# Patient Record
Sex: FEMALE | Race: WHITE | Employment: OTHER | URBAN - METROPOLITAN AREA
[De-identification: names, ages, dates, MRNs, and addresses within clinical notes are randomized per-mention and may not be internally consistent; named-entity substitution may affect disease eponyms.]

---

## 2017-01-24 PROCEDURE — 81003 URINALYSIS AUTO W/O SCOPE: CPT | Performed by: INTERNAL MEDICINE

## 2017-01-24 PROCEDURE — 87086 URINE CULTURE/COLONY COUNT: CPT | Performed by: INTERNAL MEDICINE

## 2017-01-31 PROBLEM — N18.30 CRI (CHRONIC RENAL INSUFFICIENCY), STAGE 3 (MODERATE) (HCC): Status: ACTIVE | Noted: 2017-01-31

## 2017-03-02 ENCOUNTER — APPOINTMENT (OUTPATIENT)
Dept: GENERAL RADIOLOGY | Age: 67
End: 2017-03-02
Attending: EMERGENCY MEDICINE
Payer: COMMERCIAL

## 2017-03-02 ENCOUNTER — HOSPITAL ENCOUNTER (OUTPATIENT)
Age: 67
Discharge: HOME | End: 2017-03-02
Attending: EMERGENCY MEDICINE
Payer: COMMERCIAL

## 2017-03-02 VITALS
RESPIRATION RATE: 20 BRPM | WEIGHT: 123 LBS | DIASTOLIC BLOOD PRESSURE: 72 MMHG | OXYGEN SATURATION: 100 % | BODY MASS INDEX: 21 KG/M2 | HEIGHT: 64 IN | TEMPERATURE: 99 F | SYSTOLIC BLOOD PRESSURE: 160 MMHG | HEART RATE: 79 BPM

## 2017-03-02 DIAGNOSIS — R06.00 DYSPNEA, UNSPECIFIED TYPE: Primary | ICD-10-CM

## 2017-03-02 PROCEDURE — 99214 OFFICE O/P EST MOD 30 MIN: CPT

## 2017-03-02 PROCEDURE — 99215 OFFICE O/P EST HI 40 MIN: CPT

## 2017-03-02 PROCEDURE — 93010 ELECTROCARDIOGRAM REPORT: CPT | Performed by: EMERGENCY MEDICINE

## 2017-03-02 PROCEDURE — 93005 ELECTROCARDIOGRAM TRACING: CPT

## 2017-03-02 PROCEDURE — 71020 XR CHEST PA + LAT CHEST (CPT=71020): CPT

## 2017-03-02 PROCEDURE — 94640 AIRWAY INHALATION TREATMENT: CPT

## 2017-03-02 PROCEDURE — 93010 ELECTROCARDIOGRAM REPORT: CPT

## 2017-03-02 RX ORDER — ALBUTEROL SULFATE 90 UG/1
1 AEROSOL, METERED RESPIRATORY (INHALATION) EVERY 4 HOURS PRN
Qty: 1 INHALER | Refills: 0 | Status: SHIPPED | OUTPATIENT
Start: 2017-03-02 | End: 2017-03-16

## 2017-03-02 RX ORDER — IPRATROPIUM BROMIDE AND ALBUTEROL SULFATE 2.5; .5 MG/3ML; MG/3ML
3 SOLUTION RESPIRATORY (INHALATION) ONCE
Status: COMPLETED | OUTPATIENT
Start: 2017-03-02 | End: 2017-03-02

## 2017-03-02 NOTE — ED INITIAL ASSESSMENT (HPI)
PATIENT C/O SHORTNESS OF BREATH AT REST X \"FEW DAYS\"  DENIES FEVERS AT HOME. REPORTS RECENT CLEAR \"RUNNY NOSE. \"  REPORTS ACTIVITIES SUCH AS TALKING MAKE HER SHORT OF BREATH.   PATIENT STATES SHE HAS A HISTORY OF A \"SPOT\" ON HER LUNG, BUT STATES IT HA

## 2017-03-02 NOTE — ED PROVIDER NOTES
Devika Kelley is a 74year old female who presents for evaluation of  shortness of breath  HPI:   Pt complains of shortness of breath which she has had for the past day. Shortness of breath is worse with exertion.   Patient states she has had clear rhinor Calcium Carbonate-Vitamin D (CALCIUM + D OR) None Entered Disp:  Rfl:         Alc-Benzyl Alc-Sulf*    Rash, Itching  Bactrim [Sulfametho*      Ibuprofen                 Statins                   Zetia [Ezetimibe]          Past Medical History   Diagnosis LUNGS: Decreased breath sounds bilaterally on auscultation no wheezing or crackles  CARDIO: RRR without murmur  GI: no masses, HSM or tenderness  MUSCULOSKELETAL: back is not tender  EXTREMITIES: no cyanosis, clubbing or edema  NEURO: Oriented times thre MDM     The patient had an EKG done which showed a normal sinus rhythm rate 62 with motion artifact and nonspecific ST-T wave changes no acute ischemic changes and QRS duration and QT interval with WNL    After a DuoNeb treatment was

## 2017-03-15 ENCOUNTER — HOSPITAL ENCOUNTER (EMERGENCY)
Facility: HOSPITAL | Age: 67
Discharge: LEFT BEFORE DISCHARGE | End: 2017-03-15
Attending: EMERGENCY MEDICINE
Payer: MEDICARE

## 2017-03-15 VITALS
BODY MASS INDEX: 20.66 KG/M2 | TEMPERATURE: 98 F | WEIGHT: 121 LBS | DIASTOLIC BLOOD PRESSURE: 88 MMHG | OXYGEN SATURATION: 100 % | SYSTOLIC BLOOD PRESSURE: 132 MMHG | HEIGHT: 64 IN | RESPIRATION RATE: 20 BRPM | HEART RATE: 70 BPM

## 2017-03-15 DIAGNOSIS — Z53.21 PATIENT LEFT WITHOUT BEING SEEN: Primary | ICD-10-CM

## 2017-03-15 PROCEDURE — 93005 ELECTROCARDIOGRAM TRACING: CPT

## 2017-03-15 PROCEDURE — 99283 EMERGENCY DEPT VISIT LOW MDM: CPT

## 2017-03-15 PROCEDURE — 93010 ELECTROCARDIOGRAM REPORT: CPT | Performed by: INTERNAL MEDICINE

## 2017-03-15 NOTE — ED INITIAL ASSESSMENT (HPI)
Sob x 1 wk and sore throat neg for strep on antibiotic for this from own FMD pt states she is sob when walking pt has a CT of chest next month for a spot on Chest xray 6 months ago denies any chest pain at this time

## 2017-08-15 PROCEDURE — 86235 NUCLEAR ANTIGEN ANTIBODY: CPT | Performed by: NURSE PRACTITIONER

## 2017-08-15 PROCEDURE — 86803 HEPATITIS C AB TEST: CPT | Performed by: INTERNAL MEDICINE

## 2017-09-03 NOTE — ED PROVIDER NOTES
Patient Seen in: [facility] Emergency Department    History   Patient presents with:   Insomnia  Vomiting    Stated Complaint: Insomnia     HPI    70 yo F with PMH HTN, HL, anxiety, chronic upper abdominal pain/GERD on omeprazole and morphine 15mg pr hours as needed. morphINE Sulfate IR 30 MG Oral Tab,  Take 1 tablet (30 mg total) by mouth every 4 (four) hours as needed. LORazepam 2 MG Oral Tab,  Take 1 tablet (2 mg total) by mouth nightly.  at bedtime   OMEPRAZOLE 20 MG Oral Capsule Delayed Release air)    Current:/96   Pulse 71   Temp 98.6 °F (37 °C) (Temporal)   Resp 18   Ht 162.6 cm (5' 4\")   Wt 54.4 kg   SpO2 98%   BMI 20.60 kg/m²         Physical Exam   Constitutional: No distress. HEENT: MMM. Head: Normocephalic.    Cardiovascular: RRR family with prn antiemetics. 1917: Labs nonacute, clinically/symptomatically improved with lorazepam. DC home in care of family with prn antiemetics.       Disposition and Plan     Clinical Impression:  Anxiety state  (primary encounter diagnosis)  Nause

## 2017-09-03 NOTE — ED INITIAL ASSESSMENT (HPI)
Pt called EMS because she is unable to sleep and her appetite is low. Last night she took her Morphine for chronic pain which she threw up; she claims that she would vomit out everything she ate. This has been going on since yesterday.   Pt claims to be an

## 2017-09-04 NOTE — ED NOTES
Medicated with additional 1mg IV ativan as ordered, preparing for discharge home. Granddaughter Jany Presser to transport pt home and stay the night with her (awaiting Romina's arrival to ER).

## 2017-09-19 PROCEDURE — 82570 ASSAY OF URINE CREATININE: CPT | Performed by: INTERNAL MEDICINE

## 2017-09-19 PROCEDURE — 82043 UR ALBUMIN QUANTITATIVE: CPT | Performed by: INTERNAL MEDICINE

## 2017-09-19 PROCEDURE — 81003 URINALYSIS AUTO W/O SCOPE: CPT | Performed by: INTERNAL MEDICINE

## 2017-10-31 PROCEDURE — 82570 ASSAY OF URINE CREATININE: CPT | Performed by: INTERNAL MEDICINE

## 2017-10-31 PROCEDURE — 84156 ASSAY OF PROTEIN URINE: CPT | Performed by: INTERNAL MEDICINE

## 2017-10-31 PROCEDURE — 83970 ASSAY OF PARATHORMONE: CPT | Performed by: INTERNAL MEDICINE

## 2018-01-04 ENCOUNTER — HOSPITAL ENCOUNTER (EMERGENCY)
Facility: HOSPITAL | Age: 68
Discharge: HOME | End: 2018-01-04
Payer: MEDICARE

## 2018-01-04 ENCOUNTER — APPOINTMENT (OUTPATIENT)
Dept: GENERAL RADIOLOGY | Facility: HOSPITAL | Age: 68
End: 2018-01-04
Payer: MEDICARE

## 2018-01-04 VITALS
HEIGHT: 64 IN | TEMPERATURE: 98 F | WEIGHT: 108 LBS | DIASTOLIC BLOOD PRESSURE: 60 MMHG | OXYGEN SATURATION: 100 % | BODY MASS INDEX: 18.44 KG/M2 | HEART RATE: 63 BPM | RESPIRATION RATE: 18 BRPM | SYSTOLIC BLOOD PRESSURE: 165 MMHG

## 2018-01-04 DIAGNOSIS — S29.011A MUSCLE STRAIN OF CHEST WALL, INITIAL ENCOUNTER: Primary | ICD-10-CM

## 2018-01-04 DIAGNOSIS — R07.89 CHEST PAIN, ATYPICAL: ICD-10-CM

## 2018-01-04 LAB
ANION GAP SERPL CALC-SCNC: 8 MMOL/L (ref 0–18)
BASOPHILS # BLD: 0.1 K/UL (ref 0–0.2)
BASOPHILS NFR BLD: 1 %
BUN SERPL-MCNC: 18 MG/DL (ref 8–20)
BUN/CREAT SERPL: 13.2 (ref 10–20)
CALCIUM SERPL-MCNC: 9.4 MG/DL (ref 8.5–10.5)
CHLORIDE SERPL-SCNC: 104 MMOL/L (ref 95–110)
CO2 SERPL-SCNC: 26 MMOL/L (ref 22–32)
CREAT SERPL-MCNC: 1.36 MG/DL (ref 0.5–1.5)
EOSINOPHIL # BLD: 0.1 K/UL (ref 0–0.7)
EOSINOPHIL NFR BLD: 1 %
ERYTHROCYTE [DISTWIDTH] IN BLOOD BY AUTOMATED COUNT: 13.8 % (ref 11–15)
GLUCOSE SERPL-MCNC: 91 MG/DL (ref 70–99)
HCT VFR BLD AUTO: 40.7 % (ref 35–48)
HGB BLD-MCNC: 13.7 G/DL (ref 12–16)
LYMPHOCYTES # BLD: 2.9 K/UL (ref 1–4)
LYMPHOCYTES NFR BLD: 36 %
MCH RBC QN AUTO: 29.1 PG (ref 27–32)
MCHC RBC AUTO-ENTMCNC: 33.7 G/DL (ref 32–37)
MCV RBC AUTO: 86.5 FL (ref 80–100)
MONOCYTES # BLD: 0.5 K/UL (ref 0–1)
MONOCYTES NFR BLD: 7 %
NEUTROPHILS # BLD AUTO: 4.4 K/UL (ref 1.8–7.7)
NEUTROPHILS NFR BLD: 55 %
OSMOLALITY UR CALC.SUM OF ELEC: 287 MOSM/KG (ref 275–295)
PLATELET # BLD AUTO: 184 K/UL (ref 140–400)
PMV BLD AUTO: 9.1 FL (ref 7.4–10.3)
POTASSIUM SERPL-SCNC: 3.7 MMOL/L (ref 3.3–5.1)
RBC # BLD AUTO: 4.71 M/UL (ref 3.7–5.4)
SODIUM SERPL-SCNC: 138 MMOL/L (ref 136–144)
TROPONIN I SERPL-MCNC: 0 NG/ML (ref ?–0.03)
WBC # BLD AUTO: 8 K/UL (ref 4–11)

## 2018-01-04 PROCEDURE — 99285 EMERGENCY DEPT VISIT HI MDM: CPT

## 2018-01-04 PROCEDURE — 93010 ELECTROCARDIOGRAM REPORT: CPT | Performed by: INTERNAL MEDICINE

## 2018-01-04 PROCEDURE — 80048 BASIC METABOLIC PNL TOTAL CA: CPT

## 2018-01-04 PROCEDURE — 85025 COMPLETE CBC W/AUTO DIFF WBC: CPT

## 2018-01-04 PROCEDURE — 84484 ASSAY OF TROPONIN QUANT: CPT

## 2018-01-04 PROCEDURE — 93005 ELECTROCARDIOGRAM TRACING: CPT

## 2018-01-04 PROCEDURE — 71046 X-RAY EXAM CHEST 2 VIEWS: CPT

## 2018-01-04 PROCEDURE — 36415 COLL VENOUS BLD VENIPUNCTURE: CPT

## 2018-01-04 NOTE — ED INITIAL ASSESSMENT (HPI)
R sided CP wrapping to back x2 days, pt is on heart monitor for 30 days r/t palpitations.  Pt states she is nervous right now

## 2018-01-05 NOTE — ED PROVIDER NOTES
Patient Seen in: [facility] AND [facility] Emergency Department    History   Patient presents with:  Chest Pain Angina (cardiovascular)    Stated Complaint: left sided chest pain    HPI    Patient presents with complaint of pain to the right side of the chest. Surgeon: Ross Meredith MD;  Location: [address] [place] [address]    Medications :   lisinopril 5 MG Oral Tab,  Take 1 tablet (5 mg total) by mouth daily. LORazepam 2 MG Oral Tab,  Take 1 tablet (2 mg total) by mouth nightly.  at bedtime   V no vomiting  Genitourinary: No dysuria    Positive for stated complaint: left sided chest pain  Other systems are as noted in HPI. Constitutional and vital signs reviewed. All other systems reviewed and negative except as noted above.     PSFH element doctor she is going to use lidocaine patches at the store as well.     ED Course     Labs Reviewed   BASIC METABOLIC PANEL (8) - Abnormal; Notable for the following:        Result Value    GFR, Non- 39 (*)     GFR, -American 47 (*)

## 2018-01-09 PROBLEM — I70.0 ATHEROSCLEROSIS OF AORTA (HCC): Status: ACTIVE | Noted: 2018-01-09

## 2018-01-09 PROBLEM — I70.0 ATHEROSCLEROSIS OF AORTA: Status: ACTIVE | Noted: 2018-01-09

## 2018-02-13 PROBLEM — Z82.49 FAMILY HISTORY OF HEART DISEASE: Status: ACTIVE | Noted: 2018-02-13

## 2018-03-06 PROCEDURE — 82607 VITAMIN B-12: CPT | Performed by: INTERNAL MEDICINE

## 2018-03-29 ENCOUNTER — HOSPITAL ENCOUNTER (INPATIENT)
Facility: HOSPITAL | Age: 68
LOS: 14 days | Discharge: NURSING, REHAB OR LONG-TERM CARE | DRG: 215 | End: 2018-04-13
Admitting: HOSPITALIST
Payer: MEDICARE

## 2018-03-29 ENCOUNTER — APPOINTMENT (OUTPATIENT)
Dept: GENERAL RADIOLOGY | Facility: HOSPITAL | Age: 68
DRG: 215 | End: 2018-03-29
Payer: MEDICARE

## 2018-03-29 DIAGNOSIS — R07.9 ACUTE CHEST PAIN: Primary | ICD-10-CM

## 2018-03-29 PROBLEM — N17.9 ACUTE KIDNEY INJURY (HCC): Status: ACTIVE | Noted: 2018-03-29

## 2018-03-29 PROBLEM — N17.9 ACUTE KIDNEY INJURY: Status: ACTIVE | Noted: 2018-03-29

## 2018-03-29 PROCEDURE — 71046 X-RAY EXAM CHEST 2 VIEWS: CPT

## 2018-03-29 RX ORDER — HEPARIN SODIUM 5000 [USP'U]/ML
5000 INJECTION, SOLUTION INTRAVENOUS; SUBCUTANEOUS EVERY 8 HOURS SCHEDULED
Status: DISCONTINUED | OUTPATIENT
Start: 2018-03-29 | End: 2018-03-30

## 2018-03-29 RX ORDER — VERAPAMIL HYDROCHLORIDE 240 MG/1
240 TABLET, FILM COATED, EXTENDED RELEASE ORAL NIGHTLY
Status: DISCONTINUED | OUTPATIENT
Start: 2018-03-29 | End: 2018-03-29

## 2018-03-29 RX ORDER — TRAMADOL HYDROCHLORIDE 50 MG/1
50 TABLET ORAL ONCE
Status: COMPLETED | OUTPATIENT
Start: 2018-03-29 | End: 2018-03-29

## 2018-03-29 RX ORDER — MORPHINE SULFATE 15 MG/1
30 TABLET ORAL EVERY 4 HOURS PRN
Status: DISCONTINUED | OUTPATIENT
Start: 2018-03-29 | End: 2018-04-13

## 2018-03-29 RX ORDER — PANTOPRAZOLE SODIUM 20 MG/1
20 TABLET, DELAYED RELEASE ORAL
Status: DISCONTINUED | OUTPATIENT
Start: 2018-03-30 | End: 2018-03-30

## 2018-03-29 RX ORDER — BUSPIRONE HYDROCHLORIDE 5 MG/1
5 TABLET ORAL 2 TIMES DAILY
Status: DISCONTINUED | OUTPATIENT
Start: 2018-03-29 | End: 2018-03-30

## 2018-03-29 RX ORDER — ONDANSETRON 2 MG/ML
4 INJECTION INTRAMUSCULAR; INTRAVENOUS EVERY 6 HOURS PRN
Status: DISCONTINUED | OUTPATIENT
Start: 2018-03-29 | End: 2018-04-11

## 2018-03-29 RX ORDER — ASPIRIN 81 MG/1
324 TABLET, CHEWABLE ORAL ONCE
Status: COMPLETED | OUTPATIENT
Start: 2018-03-29 | End: 2018-03-29

## 2018-03-29 RX ORDER — LORAZEPAM 0.5 MG/1
2 TABLET ORAL NIGHTLY
Status: DISCONTINUED | OUTPATIENT
Start: 2018-03-29 | End: 2018-04-04

## 2018-03-29 RX ORDER — LORAZEPAM 2 MG/ML
1 INJECTION INTRAMUSCULAR ONCE
Status: COMPLETED | OUTPATIENT
Start: 2018-03-29 | End: 2018-03-29

## 2018-03-29 RX ORDER — TRAMADOL HYDROCHLORIDE 50 MG/1
50 TABLET ORAL EVERY 12 HOURS PRN
Status: DISCONTINUED | OUTPATIENT
Start: 2018-03-29 | End: 2018-03-30

## 2018-03-29 RX ORDER — ASPIRIN 81 MG/1
81 TABLET, CHEWABLE ORAL
Status: DISCONTINUED | OUTPATIENT
Start: 2018-03-30 | End: 2018-04-01

## 2018-03-29 NOTE — ED INITIAL ASSESSMENT (HPI)
Pt reports chelle and chest pressure that began this am while at work. Pt states that she just feels like she cant catch her breath. Pt denies pain but reports pressure at this time. ot denies cough or fever.

## 2018-03-30 ENCOUNTER — ANESTHESIA (OUTPATIENT)
Dept: INTERVENTIONAL RADIOLOGY/VASCULAR | Facility: HOSPITAL | Age: 68
End: 2018-03-30

## 2018-03-30 ENCOUNTER — ANESTHESIA EVENT (OUTPATIENT)
Dept: INTERVENTIONAL RADIOLOGY/VASCULAR | Facility: HOSPITAL | Age: 68
End: 2018-03-30

## 2018-03-30 ENCOUNTER — APPOINTMENT (OUTPATIENT)
Dept: INTERVENTIONAL RADIOLOGY/VASCULAR | Facility: HOSPITAL | Age: 68
DRG: 215 | End: 2018-03-30
Attending: INTERNAL MEDICINE
Payer: MEDICARE

## 2018-03-30 ENCOUNTER — SURGERY (OUTPATIENT)
Age: 68
End: 2018-03-30

## 2018-03-30 ENCOUNTER — APPOINTMENT (OUTPATIENT)
Dept: CV DIAGNOSTICS | Facility: HOSPITAL | Age: 68
DRG: 215 | End: 2018-03-30
Attending: THORACIC SURGERY (CARDIOTHORACIC VASCULAR SURGERY)
Payer: MEDICARE

## 2018-03-30 ENCOUNTER — ANESTHESIA (OUTPATIENT)
Dept: SURGERY | Facility: HOSPITAL | Age: 68
DRG: 215 | End: 2018-03-30
Payer: MEDICARE

## 2018-03-30 ENCOUNTER — APPOINTMENT (OUTPATIENT)
Dept: GENERAL RADIOLOGY | Facility: HOSPITAL | Age: 68
DRG: 215 | End: 2018-03-30
Attending: NURSE PRACTITIONER
Payer: MEDICARE

## 2018-03-30 ENCOUNTER — ANESTHESIA EVENT (OUTPATIENT)
Dept: SURGERY | Facility: HOSPITAL | Age: 68
DRG: 215 | End: 2018-03-30
Payer: MEDICARE

## 2018-03-30 PROCEDURE — 30233R1 TRANSFUSION OF NONAUTOLOGOUS PLATELETS INTO PERIPHERAL VEIN, PERCUTANEOUS APPROACH: ICD-10-PCS | Performed by: ANESTHESIOLOGY

## 2018-03-30 PROCEDURE — 5A0221D ASSISTANCE WITH CARDIAC OUTPUT USING IMPELLER PUMP, CONTINUOUS: ICD-10-PCS | Performed by: THORACIC SURGERY (CARDIOTHORACIC VASCULAR SURGERY)

## 2018-03-30 PROCEDURE — 02HA3RZ INSERTION OF SHORT-TERM EXTERNAL HEART ASSIST SYSTEM INTO HEART, PERCUTANEOUS APPROACH: ICD-10-PCS | Performed by: THORACIC SURGERY (CARDIOTHORACIC VASCULAR SURGERY)

## 2018-03-30 PROCEDURE — 021109W BYPASS CORONARY ARTERY, TWO ARTERIES FROM AORTA WITH AUTOLOGOUS VENOUS TISSUE, OPEN APPROACH: ICD-10-PCS | Performed by: THORACIC SURGERY (CARDIOTHORACIC VASCULAR SURGERY)

## 2018-03-30 PROCEDURE — 30233N1 TRANSFUSION OF NONAUTOLOGOUS RED BLOOD CELLS INTO PERIPHERAL VEIN, PERCUTANEOUS APPROACH: ICD-10-PCS | Performed by: ANESTHESIOLOGY

## 2018-03-30 PROCEDURE — 0BH17EZ INSERTION OF ENDOTRACHEAL AIRWAY INTO TRACHEA, VIA NATURAL OR ARTIFICIAL OPENING: ICD-10-PCS | Performed by: ANESTHESIOLOGY

## 2018-03-30 PROCEDURE — P9045 ALBUMIN (HUMAN), 5%, 250 ML: HCPCS | Performed by: ANESTHESIOLOGY

## 2018-03-30 PROCEDURE — 93312 ECHO TRANSESOPHAGEAL: CPT | Performed by: ANESTHESIOLOGY

## 2018-03-30 PROCEDURE — 36430 TRANSFUSION BLD/BLD COMPNT: CPT | Performed by: ANESTHESIOLOGY

## 2018-03-30 PROCEDURE — 30233M1 TRANSFUSION OF NONAUTOLOGOUS PLASMA CRYOPRECIPITATE INTO PERIPHERAL VEIN, PERCUTANEOUS APPROACH: ICD-10-PCS | Performed by: ANESTHESIOLOGY

## 2018-03-30 PROCEDURE — 93307 TTE W/O DOPPLER COMPLETE: CPT | Performed by: THORACIC SURGERY (CARDIOTHORACIC VASCULAR SURGERY)

## 2018-03-30 PROCEDURE — 30233K1 TRANSFUSION OF NONAUTOLOGOUS FROZEN PLASMA INTO PERIPHERAL VEIN, PERCUTANEOUS APPROACH: ICD-10-PCS | Performed by: ANESTHESIOLOGY

## 2018-03-30 PROCEDURE — B2111ZZ FLUOROSCOPY OF MULTIPLE CORONARY ARTERIES USING LOW OSMOLAR CONTRAST: ICD-10-PCS | Performed by: INTERNAL MEDICINE

## 2018-03-30 PROCEDURE — 5A1945Z RESPIRATORY VENTILATION, 24-96 CONSECUTIVE HOURS: ICD-10-PCS | Performed by: ANESTHESIOLOGY

## 2018-03-30 PROCEDURE — B246ZZ4 ULTRASONOGRAPHY OF RIGHT AND LEFT HEART, TRANSESOPHAGEAL: ICD-10-PCS | Performed by: INTERNAL MEDICINE

## 2018-03-30 PROCEDURE — 71045 X-RAY EXAM CHEST 1 VIEW: CPT | Performed by: NURSE PRACTITIONER

## 2018-03-30 PROCEDURE — 4A023N7 MEASUREMENT OF CARDIAC SAMPLING AND PRESSURE, LEFT HEART, PERCUTANEOUS APPROACH: ICD-10-PCS | Performed by: INTERNAL MEDICINE

## 2018-03-30 RX ORDER — ASPIRIN 81 MG/1
324 TABLET, CHEWABLE ORAL ONCE
Status: COMPLETED | OUTPATIENT
Start: 2018-03-30 | End: 2018-03-30

## 2018-03-30 RX ORDER — ACETAMINOPHEN 325 MG/1
650 TABLET ORAL EVERY 6 HOURS PRN
Status: DISCONTINUED | OUTPATIENT
Start: 2018-03-30 | End: 2018-04-13

## 2018-03-30 RX ORDER — VECURONIUM BROMIDE 1 MG/ML
INJECTION, POWDER, LYOPHILIZED, FOR SOLUTION INTRAVENOUS AS NEEDED
Status: DISCONTINUED | OUTPATIENT
Start: 2018-03-30 | End: 2018-03-30 | Stop reason: SURG

## 2018-03-30 RX ORDER — MIDAZOLAM HYDROCHLORIDE 1 MG/ML
INJECTION INTRAMUSCULAR; INTRAVENOUS AS NEEDED
Status: DISCONTINUED | OUTPATIENT
Start: 2018-03-30 | End: 2018-03-30 | Stop reason: SURG

## 2018-03-30 RX ORDER — FIBRINOGEN HUMAN AND THROMBIN HUMAN 1 ML
KIT TOPICAL AS NEEDED
Status: DISCONTINUED | OUTPATIENT
Start: 2018-03-30 | End: 2018-03-30 | Stop reason: HOSPADM

## 2018-03-30 RX ORDER — FAMOTIDINE 20 MG/1
20 TABLET ORAL ONCE
Status: CANCELLED | OUTPATIENT
Start: 2018-03-30 | End: 2018-03-30

## 2018-03-30 RX ORDER — MILRINONE LACTATE 0.2 MG/ML
0.38 INJECTION, SOLUTION INTRAVENOUS AS NEEDED
Status: DISCONTINUED | OUTPATIENT
Start: 2018-03-30 | End: 2018-04-04

## 2018-03-30 RX ORDER — TEMAZEPAM 15 MG/1
15 CAPSULE ORAL NIGHTLY PRN
Status: DISCONTINUED | OUTPATIENT
Start: 2018-03-30 | End: 2018-04-08

## 2018-03-30 RX ORDER — MAGNESIUM SULFATE HEPTAHYDRATE 40 MG/ML
2 INJECTION, SOLUTION INTRAVENOUS AS NEEDED
Status: DISCONTINUED | OUTPATIENT
Start: 2018-03-30 | End: 2018-04-13

## 2018-03-30 RX ORDER — HEPARIN SODIUM 1000 [USP'U]/ML
INJECTION, SOLUTION INTRAVENOUS; SUBCUTANEOUS AS NEEDED
Status: DISCONTINUED | OUTPATIENT
Start: 2018-03-30 | End: 2018-03-30 | Stop reason: SURG

## 2018-03-30 RX ORDER — NITROGLYCERIN 20 MG/100ML
INJECTION INTRAVENOUS CONTINUOUS PRN
Status: DISCONTINUED | OUTPATIENT
Start: 2018-03-30 | End: 2018-04-06

## 2018-03-30 RX ORDER — ASPIRIN 81 MG/1
81 TABLET ORAL DAILY
Status: DISCONTINUED | OUTPATIENT
Start: 2018-03-31 | End: 2018-04-02

## 2018-03-30 RX ORDER — MORPHINE SULFATE 2 MG/ML
2 INJECTION, SOLUTION INTRAMUSCULAR; INTRAVENOUS EVERY 2 HOUR PRN
Status: DISCONTINUED | OUTPATIENT
Start: 2018-03-30 | End: 2018-04-11

## 2018-03-30 RX ORDER — MIDAZOLAM HYDROCHLORIDE 1 MG/ML
INJECTION INTRAMUSCULAR; INTRAVENOUS
Status: COMPLETED
Start: 2018-03-30 | End: 2018-03-30

## 2018-03-30 RX ORDER — DOXEPIN HYDROCHLORIDE 50 MG/1
1 CAPSULE ORAL DAILY
Status: DISCONTINUED | OUTPATIENT
Start: 2018-03-31 | End: 2018-04-13

## 2018-03-30 RX ORDER — HEPARIN SODIUM 5000 [USP'U]/ML
5000 INJECTION, SOLUTION INTRAVENOUS; SUBCUTANEOUS EVERY 8 HOURS SCHEDULED
Status: DISCONTINUED | OUTPATIENT
Start: 2018-03-31 | End: 2018-03-31

## 2018-03-30 RX ORDER — TRAMADOL HYDROCHLORIDE 50 MG/1
50 TABLET ORAL EVERY 6 HOURS PRN
Status: DISCONTINUED | OUTPATIENT
Start: 2018-03-30 | End: 2018-04-13

## 2018-03-30 RX ORDER — ACETAMINOPHEN 160 MG/5ML
650 SOLUTION ORAL EVERY 6 HOURS PRN
Status: DISCONTINUED | OUTPATIENT
Start: 2018-03-30 | End: 2018-04-11

## 2018-03-30 RX ORDER — SENNOSIDES 8.6 MG
8.6 TABLET ORAL 2 TIMES DAILY
Status: DISCONTINUED | OUTPATIENT
Start: 2018-03-31 | End: 2018-04-13

## 2018-03-30 RX ORDER — METOCLOPRAMIDE 10 MG/1
10 TABLET ORAL ONCE
Status: CANCELLED | OUTPATIENT
Start: 2018-03-30 | End: 2018-03-30

## 2018-03-30 RX ORDER — MAGNESIUM SULFATE 1 G/100ML
1 INJECTION INTRAVENOUS AS NEEDED
Status: DISCONTINUED | OUTPATIENT
Start: 2018-03-30 | End: 2018-04-13

## 2018-03-30 RX ORDER — HYDROCODONE BITARTRATE AND ACETAMINOPHEN 5; 325 MG/1; MG/1
1 TABLET ORAL EVERY 4 HOURS PRN
Status: DISCONTINUED | OUTPATIENT
Start: 2018-03-30 | End: 2018-04-13

## 2018-03-30 RX ORDER — BUSPIRONE HYDROCHLORIDE 5 MG/1
5 TABLET ORAL 2 TIMES DAILY PRN
Status: DISCONTINUED | OUTPATIENT
Start: 2018-03-30 | End: 2018-04-13

## 2018-03-30 RX ORDER — ACETAMINOPHEN 10 MG/ML
1000 INJECTION, SOLUTION INTRAVENOUS EVERY 8 HOURS
Status: COMPLETED | OUTPATIENT
Start: 2018-03-30 | End: 2018-03-31

## 2018-03-30 RX ORDER — ACETAMINOPHEN 325 MG/1
650 TABLET ORAL EVERY 4 HOURS PRN
Status: DISCONTINUED | OUTPATIENT
Start: 2018-03-30 | End: 2018-04-13

## 2018-03-30 RX ORDER — SODIUM CHLORIDE 9 MG/ML
INJECTION, SOLUTION INTRAVENOUS
Status: COMPLETED
Start: 2018-03-30 | End: 2018-03-30

## 2018-03-30 RX ORDER — POTASSIUM CHLORIDE 29.8 MG/ML
40 INJECTION INTRAVENOUS AS NEEDED
Status: DISCONTINUED | OUTPATIENT
Start: 2018-03-30 | End: 2018-04-13

## 2018-03-30 RX ORDER — ALBUMIN, HUMAN INJ 5% 5 %
250 SOLUTION INTRAVENOUS ONCE AS NEEDED
Status: COMPLETED | OUTPATIENT
Start: 2018-03-30 | End: 2018-03-31

## 2018-03-30 RX ORDER — POLYETHYLENE GLYCOL 3350 17 G/17G
17 POWDER, FOR SOLUTION ORAL DAILY PRN
Status: DISCONTINUED | OUTPATIENT
Start: 2018-03-30 | End: 2018-04-13

## 2018-03-30 RX ORDER — DEXTROSE, SODIUM CHLORIDE, SODIUM LACTATE, POTASSIUM CHLORIDE, AND CALCIUM CHLORIDE 5; .6; .31; .03; .02 G/100ML; G/100ML; G/100ML; G/100ML; G/100ML
INJECTION, SOLUTION INTRAVENOUS CONTINUOUS
Status: DISCONTINUED | OUTPATIENT
Start: 2018-03-30 | End: 2018-04-04

## 2018-03-30 RX ORDER — DEXTROSE MONOHYDRATE 50 MG/ML
INJECTION, SOLUTION INTRAVENOUS
Status: COMPLETED
Start: 2018-03-30 | End: 2018-03-30

## 2018-03-30 RX ORDER — PROTAMINE SULFATE 10 MG/ML
INJECTION, SOLUTION INTRAVENOUS AS NEEDED
Status: DISCONTINUED | OUTPATIENT
Start: 2018-03-30 | End: 2018-03-30 | Stop reason: SURG

## 2018-03-30 RX ORDER — DIPHENHYDRAMINE HYDROCHLORIDE 50 MG/ML
INJECTION INTRAMUSCULAR; INTRAVENOUS
Status: COMPLETED
Start: 2018-03-30 | End: 2018-03-30

## 2018-03-30 RX ORDER — SODIUM CHLORIDE 9 MG/ML
INJECTION, SOLUTION INTRAVENOUS CONTINUOUS
Status: DISCONTINUED | OUTPATIENT
Start: 2018-03-30 | End: 2018-03-31

## 2018-03-30 RX ORDER — MORPHINE SULFATE 4 MG/ML
8 INJECTION, SOLUTION INTRAMUSCULAR; INTRAVENOUS EVERY 2 HOUR PRN
Status: DISCONTINUED | OUTPATIENT
Start: 2018-03-30 | End: 2018-04-11

## 2018-03-30 RX ORDER — AMIODARONE HYDROCHLORIDE 50 MG/ML
INJECTION, SOLUTION INTRAVENOUS
Status: COMPLETED
Start: 2018-03-30 | End: 2018-03-30

## 2018-03-30 RX ORDER — CEFAZOLIN SODIUM/WATER 2 G/20 ML
2 SYRINGE (ML) INTRAVENOUS EVERY 8 HOURS
Status: DISCONTINUED | OUTPATIENT
Start: 2018-03-31 | End: 2018-04-01

## 2018-03-30 RX ORDER — BISACODYL 10 MG
10 SUPPOSITORY, RECTAL RECTAL
Status: DISCONTINUED | OUTPATIENT
Start: 2018-03-30 | End: 2018-04-13

## 2018-03-30 RX ORDER — VERAPAMIL HYDROCHLORIDE 2.5 MG/ML
INJECTION, SOLUTION INTRAVENOUS
Status: COMPLETED
Start: 2018-03-30 | End: 2018-03-30

## 2018-03-30 RX ORDER — METOCLOPRAMIDE HYDROCHLORIDE 5 MG/ML
10 INJECTION INTRAMUSCULAR; INTRAVENOUS EVERY 6 HOURS
Status: DISCONTINUED | OUTPATIENT
Start: 2018-03-30 | End: 2018-04-01

## 2018-03-30 RX ORDER — DOBUTAMINE HYDROCHLORIDE 100 MG/100ML
INJECTION INTRAVENOUS CONTINUOUS PRN
Status: DISCONTINUED | OUTPATIENT
Start: 2018-03-30 | End: 2018-03-30 | Stop reason: SURG

## 2018-03-30 RX ORDER — NITROGLYCERIN 20 MG/100ML
INJECTION INTRAVENOUS
Status: COMPLETED
Start: 2018-03-30 | End: 2018-03-30

## 2018-03-30 RX ORDER — CLOPIDOGREL BISULFATE 75 MG/1
75 TABLET ORAL DAILY
Status: DISCONTINUED | OUTPATIENT
Start: 2018-03-31 | End: 2018-04-02

## 2018-03-30 RX ORDER — SODIUM CHLORIDE 9 MG/ML
INJECTION, SOLUTION INTRAVENOUS
Status: DISPENSED
Start: 2018-03-30 | End: 2018-03-31

## 2018-03-30 RX ORDER — SODIUM CHLORIDE 9 MG/ML
INJECTION, SOLUTION INTRAVENOUS CONTINUOUS
Status: DISCONTINUED | OUTPATIENT
Start: 2018-03-30 | End: 2018-03-30

## 2018-03-30 RX ORDER — DEXTROSE MONOHYDRATE 50 MG/ML
INJECTION, SOLUTION INTRAVENOUS
Status: DISCONTINUED
Start: 2018-03-30 | End: 2018-03-30 | Stop reason: WASHOUT

## 2018-03-30 RX ORDER — SODIUM PHOSPHATE, DIBASIC AND SODIUM PHOSPHATE, MONOBASIC 7; 19 G/133ML; G/133ML
1 ENEMA RECTAL ONCE AS NEEDED
Status: DISCONTINUED | OUTPATIENT
Start: 2018-03-30 | End: 2018-04-13

## 2018-03-30 RX ORDER — ACETAMINOPHEN 650 MG/1
650 SUPPOSITORY RECTAL EVERY 6 HOURS PRN
Status: DISCONTINUED | OUTPATIENT
Start: 2018-03-30 | End: 2018-04-11

## 2018-03-30 RX ORDER — FAMOTIDINE 20 MG/1
20 TABLET ORAL 2 TIMES DAILY
Status: DISCONTINUED | OUTPATIENT
Start: 2018-03-30 | End: 2018-04-01

## 2018-03-30 RX ORDER — MORPHINE SULFATE 4 MG/ML
4 INJECTION, SOLUTION INTRAMUSCULAR; INTRAVENOUS EVERY 2 HOUR PRN
Status: DISCONTINUED | OUTPATIENT
Start: 2018-03-30 | End: 2018-04-11

## 2018-03-30 RX ORDER — CEFAZOLIN SODIUM/WATER 2 G/20 ML
2 SYRINGE (ML) INTRAVENOUS
Status: COMPLETED | OUTPATIENT
Start: 2018-03-30 | End: 2018-03-30

## 2018-03-30 RX ORDER — ASCORBIC ACID 500 MG
500 TABLET ORAL 3 TIMES DAILY
Status: DISCONTINUED | OUTPATIENT
Start: 2018-03-31 | End: 2018-04-13

## 2018-03-30 RX ORDER — SODIUM CHLORIDE 9 MG/ML
75 INJECTION, SOLUTION INTRAVENOUS CONTINUOUS
Status: DISCONTINUED | OUTPATIENT
Start: 2018-03-30 | End: 2018-03-30 | Stop reason: CLARIF

## 2018-03-30 RX ORDER — LIDOCAINE HYDROCHLORIDE 20 MG/ML
INJECTION, SOLUTION EPIDURAL; INFILTRATION; INTRACAUDAL; PERINEURAL
Status: COMPLETED
Start: 2018-03-30 | End: 2018-03-30

## 2018-03-30 RX ORDER — ALBUMIN, HUMAN INJ 5% 5 %
SOLUTION INTRAVENOUS CONTINUOUS PRN
Status: DISCONTINUED | OUTPATIENT
Start: 2018-03-30 | End: 2018-03-30 | Stop reason: SURG

## 2018-03-30 RX ORDER — HYDROCODONE BITARTRATE AND ACETAMINOPHEN 5; 325 MG/1; MG/1
2 TABLET ORAL EVERY 4 HOURS PRN
Status: DISCONTINUED | OUTPATIENT
Start: 2018-03-30 | End: 2018-04-13

## 2018-03-30 RX ORDER — ONDANSETRON 2 MG/ML
4 INJECTION INTRAMUSCULAR; INTRAVENOUS EVERY 6 HOURS PRN
Status: DISCONTINUED | OUTPATIENT
Start: 2018-03-30 | End: 2018-04-13

## 2018-03-30 RX ORDER — FAMOTIDINE 10 MG/ML
20 INJECTION, SOLUTION INTRAVENOUS 2 TIMES DAILY
Status: DISCONTINUED | OUTPATIENT
Start: 2018-03-30 | End: 2018-04-01

## 2018-03-30 RX ORDER — HEPARIN SODIUM 1000 [USP'U]/ML
INJECTION, SOLUTION INTRAVENOUS; SUBCUTANEOUS
Status: COMPLETED
Start: 2018-03-30 | End: 2018-03-30

## 2018-03-30 RX ORDER — CHLORHEXIDINE GLUCONATE 4 G/100ML
30 SOLUTION TOPICAL
Status: COMPLETED | OUTPATIENT
Start: 2018-03-30 | End: 2018-03-30

## 2018-03-30 RX ORDER — FIBRINOGEN HUMAN AND THROMBIN HUMAN 1 ML
1 KIT TOPICAL ONCE
Status: DISCONTINUED | OUTPATIENT
Start: 2018-03-30 | End: 2018-03-30 | Stop reason: HOSPADM

## 2018-03-30 RX ORDER — SODIUM CHLORIDE 9 MG/ML
83 INJECTION, SOLUTION INTRAVENOUS CONTINUOUS
Status: CANCELLED | OUTPATIENT
Start: 2018-03-31

## 2018-03-30 RX ORDER — SODIUM CHLORIDE 9 MG/ML
INJECTION, SOLUTION INTRAVENOUS CONTINUOUS
Status: DISCONTINUED | OUTPATIENT
Start: 2018-03-30 | End: 2018-04-04

## 2018-03-30 RX ORDER — CHLORHEXIDINE GLUCONATE 0.12 MG/ML
15 RINSE ORAL
Status: DISCONTINUED | OUTPATIENT
Start: 2018-03-31 | End: 2018-04-12

## 2018-03-30 RX ORDER — SODIUM CHLORIDE 0.9 % (FLUSH) 0.9 %
10 SYRINGE (ML) INJECTION AS NEEDED
Status: DISCONTINUED | OUTPATIENT
Start: 2018-03-30 | End: 2018-04-06

## 2018-03-30 RX ADMIN — ALBUMIN, HUMAN INJ 5%: 5 SOLUTION INTRAVENOUS at 22:13:00

## 2018-03-30 RX ADMIN — PROTAMINE SULFATE 50 MG: 10 INJECTION, SOLUTION INTRAVENOUS at 21:40:00

## 2018-03-30 RX ADMIN — CEFAZOLIN SODIUM/WATER 2 G: 2 G/20 ML SYRINGE (ML) INTRAVENOUS at 21:15:00

## 2018-03-30 RX ADMIN — VECURONIUM BROMIDE 5 MG: 1 INJECTION, POWDER, LYOPHILIZED, FOR SOLUTION INTRAVENOUS at 22:00:00

## 2018-03-30 RX ADMIN — PROTAMINE SULFATE 50 MG: 10 INJECTION, SOLUTION INTRAVENOUS at 19:45:00

## 2018-03-30 RX ADMIN — VECURONIUM BROMIDE 3 MG: 1 INJECTION, POWDER, LYOPHILIZED, FOR SOLUTION INTRAVENOUS at 21:05:00

## 2018-03-30 RX ADMIN — PROTAMINE SULFATE 100 MG: 10 INJECTION, SOLUTION INTRAVENOUS at 20:25:00

## 2018-03-30 RX ADMIN — CEFAZOLIN SODIUM/WATER 2 G: 2 G/20 ML SYRINGE (ML) INTRAVENOUS at 17:41:00

## 2018-03-30 RX ADMIN — PROTAMINE SULFATE 500 MG: 10 INJECTION, SOLUTION INTRAVENOUS at 19:30:00

## 2018-03-30 RX ADMIN — MIDAZOLAM HYDROCHLORIDE 2 MG: 1 INJECTION INTRAMUSCULAR; INTRAVENOUS at 21:45:00

## 2018-03-30 RX ADMIN — HEPARIN SODIUM 20000 UNITS: 1000 INJECTION, SOLUTION INTRAVENOUS; SUBCUTANEOUS at 18:00:00

## 2018-03-30 RX ADMIN — SODIUM CHLORIDE: 9 INJECTION, SOLUTION INTRAVENOUS at 17:20:00

## 2018-03-30 RX ADMIN — VECURONIUM BROMIDE 10 MG: 1 INJECTION, POWDER, LYOPHILIZED, FOR SOLUTION INTRAVENOUS at 17:25:00

## 2018-03-30 RX ADMIN — DOBUTAMINE HYDROCHLORIDE 2.5 MCG/KG/MIN: 100 INJECTION INTRAVENOUS at 18:55:00

## 2018-03-30 RX ADMIN — PROTAMINE SULFATE 50 MG: 10 INJECTION, SOLUTION INTRAVENOUS at 19:50:00

## 2018-03-30 RX ADMIN — VECURONIUM BROMIDE 3 MG: 1 INJECTION, POWDER, LYOPHILIZED, FOR SOLUTION INTRAVENOUS at 18:55:00

## 2018-03-30 NOTE — PROGRESS NOTES
Post cath note  Cardiac cath via right radial artery. Cath showed tight circ with normal lv function. During PCI of circ the left main occluded. The pt was intubated and impella was placed in right femoral arery.  The lad and circ were wired and then dila

## 2018-03-30 NOTE — ANESTHESIA PROCEDURE NOTES
Procedure Performed: PARISH      Start Time:  3/30/2018 5:41 PM       End Time:        General Procedure Information  Diagnostic Indications for Echo:  hemodynamic monitoring  Physician Requesting Echo: NICK RING  Location performed:  OR  Not intubated  Bit

## 2018-03-30 NOTE — H&P
[address] [facility] Patient Status:  Observation    3/25/1950 MRN [id number]   [facility] 3W/SW Attending Mulu Fernandes, [address] [facility] Day # 0 PCP Bhaskar Dao MD     Date:  3/30/2018  D CATARACT WITH INTRAOCULAR LENS IMPLANT 20025;                 Surgeon: Manuel Lyles MD;  Location: [address] [facility]  Family History   Problem Relation Age of Onset   • Breast Cancer Other      2 mat aunts-age?    • Heart Dis Signs: Blood pressure 157/76, pulse 58, temperature 97.7 °F (36.5 °C), temperature source Oral, resp. rate 20, height 5' 4\" (1.626 m), weight 113 lb 3.2 oz (51.3 kg), SpO2 94 %, not currently breastfeeding.      General:  Alert, no distress, appears state (CST): Preston Quiroz MD on 3/29/2018 at 16:49          Ekg 12-lead    Result Date: 3/29/2018  ECG Report  Interpretation  --------------------------     Ekg 12-lead    Result Date: 3/29/2018  ECG Report  Interpretation  --------------------------

## 2018-03-30 NOTE — ANESTHESIA PROCEDURE NOTES
Central Line  Performed by: Anthony Dunn  Authorized by: Anthony Dunn     Procedure Start:  3/30/2018 5:27 PM  Procedure End:  3/30/2018 5:35 PM  Site Identification: surface landmarks and image stored and retrievable    Patient Location:  OR  Indicati

## 2018-03-30 NOTE — PLAN OF CARE
Problem: Patient/Family Goals  Goal: Patient/Family Short Term Goal  Patient's Short Term Goal: Patient states that she wants to make sure that her heart is OK.     Interventions:   - Diagnostic testing; serial troponins, EKGs, possible angiogram.   - See a appropriate  Outcome: Progressing      Problem: ANXIETY  Goal: Will report anxiety at manageable levels  INTERVENTIONS:  - Administer medication as ordered  - Teach and rehearse alternative coping skills  - Provide emotional support with 1:1 interaction wi

## 2018-03-30 NOTE — ANESTHESIA PREPROCEDURE EVALUATION
Anesthesia PreOp Note    HPI:     Ruth Dave is a 77year old female who presents for preoperative consultation requested by: Lon Luz MD    Date of Surgery: 3/29/2018 - 3/30/2018    Procedure(s): HEART CORONARY ARTERY BYPASS GRAFT  Indication:  Ac CATARACT WITH INTRAOCULAR LENS IMPLANT 39185;                 Surgeon: Desi Patrick MD;  Location: [address] [place] [address]      Prescriptions Prior to Admission:  lisinopril 5 MG Oral Tab Take 1 tablet (5 mg total) by mouth daily.  Harmony Su injection  Intravenous PRN Akil Parson MD 60 mg at 03/30/18 1606   morphINE Sulfate IR (MSIR) tab 30 mg 30 mg Oral Q4H PRN Fadumo Boyd MD    aspirin chewable tab 81 mg 81 mg Oral Daily Fadumo Boyd MD    LORazepam (ATIVAN) tab 2 mg 2 mg Oral Nightl 03/30/2018   MCH 28.1 01/09/2018   MCHC 33.3 03/30/2018   MCHC 31.8 01/09/2018   RDW 13.6 03/30/2018   RDW 13.1 01/09/2018    03/30/2018    01/09/2018   MPV 8.4 03/30/2018       Lab Results  Component Value Date    03/30/2018    0 questions were answered to the best of my ability. The patient desires the anesthetic management as planned.   Aurelio Byers  3/30/2018 4:32 PM

## 2018-03-30 NOTE — ED NOTES
Patient extremely anxious with rapid speech, dr Rufus Godinez notified. Pt updated on plan of care regarding admission and troponin redraw with ecg.

## 2018-03-30 NOTE — ED PROVIDER NOTES
Patient Seen in: [facility] 3w/sw    History   Patient presents with:  Dyspnea AUSTIN SOB (respiratory)  Chest Pain Angina (cardiovascular)    Stated Complaint: Chest pain and SOB    HPI    70-year-old female presents for complaint of chest pain or s [facility]    Family history reviewed and is not pertinent to presenting problem.     Smoking status: Former Smoker                                                              Packs/day: 1.50      Years: 30.00        Quit date: 5/9/2006  Smokeless tobacco: Never dry.   Psychiatric: She has a normal mood and affect. Her behavior is normal.   Nursing note and vitals reviewed.             ED Course     Labs Reviewed   BASIC METABOLIC PANEL (8) - Abnormal; Notable for the following:        Result Value    Glucose 119 ( (cpt=71046)    Result Date: 3/29/2018  PROCEDURE: XR CHEST PA + LAT CHEST (CPT=71020)  COMPARISON: [facility], XR CHEST PA + LAT CHEST (CPT=71020), 1/04/2018, 17:06. INDICATIONS: Chest pain and SOB started today. TECHNIQUE:   Two views.

## 2018-03-30 NOTE — PROGRESS NOTES
ASSESSMENT/PLAN:    Impression: 1. Chest pain in a 70-year-old female with an elevated calcium score and symptoms very suspicious   For angina. 2.  History of hypertension. 3.  History of hypercholesterolemia and has not tolerated statins.   4.  Famil aunts-age?    • Heart Disorder Father    • Hypertension Father    • Heart Disorder Mother    • Hypertension Mother    • Heart Disorder Daughter    • Lung Disorder Neg       Family History of Premature CAD: Yes   Social History:  Smoking status: Former Smoke

## 2018-03-30 NOTE — PROGRESS NOTES
[facility] Pharmacy Note:  Renal Dose Adjustment for Tramadol Willim Sa)    Sterling Britton has been prescribed Tramadol (ULTRAM)  mg orally every 12 hours as needed for pain. Estimated Creatinine Clearance: 23.1 mL/min (A) (based on SCr of 1.67 mg/dL (H)).

## 2018-03-31 ENCOUNTER — APPOINTMENT (OUTPATIENT)
Dept: GENERAL RADIOLOGY | Facility: HOSPITAL | Age: 68
DRG: 215 | End: 2018-03-31
Attending: NURSE PRACTITIONER
Payer: MEDICARE

## 2018-03-31 ENCOUNTER — APPOINTMENT (OUTPATIENT)
Dept: GENERAL RADIOLOGY | Facility: HOSPITAL | Age: 68
DRG: 215 | End: 2018-03-31
Attending: HOSPITALIST
Payer: MEDICARE

## 2018-03-31 PROCEDURE — 71045 X-RAY EXAM CHEST 1 VIEW: CPT | Performed by: NURSE PRACTITIONER

## 2018-03-31 RX ORDER — SODIUM CHLORIDE 9 MG/ML
INJECTION, SOLUTION INTRAVENOUS
Status: COMPLETED
Start: 2018-03-31 | End: 2018-04-01

## 2018-03-31 RX ORDER — SODIUM CHLORIDE 9 MG/ML
INJECTION, SOLUTION INTRAVENOUS ONCE
Status: DISCONTINUED | OUTPATIENT
Start: 2018-03-31 | End: 2018-04-02

## 2018-03-31 RX ORDER — ALBUMIN, HUMAN INJ 5% 5 %
SOLUTION INTRAVENOUS
Status: COMPLETED
Start: 2018-03-31 | End: 2018-03-31

## 2018-03-31 RX ORDER — SODIUM CHLORIDE 9 MG/ML
INJECTION, SOLUTION INTRAVENOUS
Status: COMPLETED
Start: 2018-03-31 | End: 2018-03-31

## 2018-03-31 RX ORDER — SODIUM CHLORIDE 9 MG/ML
INJECTION, SOLUTION INTRAVENOUS
Status: DISPENSED
Start: 2018-03-31 | End: 2018-03-31

## 2018-03-31 RX ORDER — SODIUM CHLORIDE 0.9 % (FLUSH) 0.9 %
10 SYRINGE (ML) INJECTION AS NEEDED
Status: DISCONTINUED | OUTPATIENT
Start: 2018-03-31 | End: 2018-04-06

## 2018-03-31 RX ORDER — PROTAMINE SULFATE 10 MG/ML
50 INJECTION, SOLUTION INTRAVENOUS ONCE
Status: COMPLETED | OUTPATIENT
Start: 2018-03-31 | End: 2018-03-31

## 2018-03-31 NOTE — PROGRESS NOTES
[facility] PROGRESS NOTE  S/P emergent CABG x 2 on 3/30/18 after acute dissection of left main in cath lab by Dr Fritz Garcia POD#1; cardiogenic shock; impella plcmt in cath lab; v fib in cath lab prior to surgery    Awake this morning; on ventilator with nitric oxide;  a

## 2018-03-31 NOTE — PROGRESS NOTES
ASSESSMENT/PLAN:     IMP  1. s/p emergent cabg for abrupt left main closure; responding; drips weaning; on impella and vetn    2. Resp failure    3.  Low plt    paln  Impella for another 24-48 hours  Leave on vent another 24 hours  hipa  Wean drmarleen alvares Hypertension Mother    • Heart Disorder Daughter    • Lung Disorder Neg      Family History of premature CAD: n   Social History: Smoking status: Former Smoker                                                              Packs/day: 1.50      Years: 30.00

## 2018-03-31 NOTE — CONSULTS
Critical Care H&P/Consult     NAME: Emma Encinas - ROOM: 230/230-A - MRN: [id number] - Age: 77year old - :  3/25/1950    Date of Admission: 3/29/2018  5:03 PM  Admission Diagnosis: Acute chest pain [R07.9]      Assessment/Plan:  1.  Hypoxemic respirat Medical History:   Diagnosis Date   • Anxiety 3/15/2010   • Atherosclerosis of aorta ([address] 1/9/2018   • CANCER    • Dyslipidemia 3/15/2010   • Essential hypertension, hypertension with unspecified goal 5/3/2016   • History of breast cancer 3/6/2015   • Hyp Intravenous PRN   0.9%  NaCl infusion  Intravenous Once   amiodarone HCl in Dextrose (CORDARONE) 360 MG/200ML premix infusion 0.5 mg/min Intravenous Continuous   EPINEPHrine HCl (ADRENALIN) 4 mg in sodium chloride 0.9 % 250 mL infusion 1-10 mcg/min Willy Hampton HCl (CORDARONE) 150 MG/3ML injection      [COMPLETED] Amiodarone HCl (CORDARONE) 150 MG/3ML injection      [COMPLETED] dextrose 5 % solution      [COMPLETED] Amiodarone HCl (CORDARONE) 150 MG/3ML injection      norepinephrine (LEVOPHED) 4 mg/250 ml premix (PEPCID) injection 20 mg 20 mg Intravenous BID   potassium chloride 20 mEq in sodium chloride 0.9% 100 mL IVPB 20 mEq Intravenous PRN   Or      potassium chloride IVPB premix 40 mEq 40 mEq Intravenous PRN   Magnesium Sulfate in D5W IVPB premix 1 g 1 g Intr solution 15 mL 15 mL Mouth/Throat Ole[employer]   Dexmedetomidine HCl (PRECEDEX) 400 mcg in sodium chloride 0.9 % 100 mL infusion 0.2-1.5 mcg/kg/hr Intravenous Continuous   sodium chloride 0.9 % infusion      [COMPLETED] TraMADol HCl (ULTRAM) tab 50 mg 50 hours) at 03/31/18 0487  Last data filed at 03/31/18 0600   Gross per 24 hour   Intake          8987.17 ml   Output             2035 ml   Net          6952.17 ml   Vent Mode: SIMV/VC  FiO2 (%):  [50 %-100 %] 50 %  S RR:  [10-14] 14  S VT:  [400 mL-600 mL] 03/30/18   0600   TROP  0.00  0.00  0.00       Imaging: I independently visualized all relevant chest imaging in PACS, agree with radiology interpretation except where noted. 3/30 11:30pm chest xray with swan in adequate position, chest tube in place.

## 2018-03-31 NOTE — OPERATIVE REPORT
Operative Note    Patient Name: Jaime Lozano    Preoperative Diagnosis:  cardiogenic shock, LM dissection, Impella placed in cath lab urgently, multiple episodes of vfib in cath lab    Postoperative Diagnosis: Acute LM dissection from cath lab, cardiogen distal was the OM1. This was opened up longitudinally as well. The bypass was performed in and end to side fashion as well. It was probed too. Cardioplegia was then given down both grafts.  Since the patient was very unstable and sick I did not attempt to p

## 2018-03-31 NOTE — PLAN OF CARE
Safety Risk - Non-Violent Restraints    • Patient will remain free from self-harm Not Progressing        Soft wrist restraints applied as pt now awake and restless and attempting to pull ett despite repeated staff orientation prn mso4

## 2018-03-31 NOTE — PLAN OF CARE
Problem: Patient/Family Goals  Goal: Patient/Family Long Term Goal  Patient's Long Term Goal: To feel better  Interventions:  - Heart healthy diet  - Medication compliance  - Testing/Angiogram  - See additional Care Plan goals for specific interventions ordered and protocol followed for replacement.  CPM    Problem: METABOLIC/FLUID AND ELECTROLYTES - ADULT  Goal: Hemodynamic stability and optimal renal function maintained  INTERVENTIONS:  - Monitor labs and assess for signs and symptoms of volume excess or patient and family of reasons restraints applied  - Assess for any contributing factors to confusion (electrolyte disturbances, delirium, medications)  - Discontinue any unnecessary medical devices as soon as possible  - Assess the patient's physical comfo resources  INTERVENTIONS:  - Identify barriers to discharge w/pt and caregiver  - Include patient/family/discharge partner in discharge planning  - Arrange for needed discharge resources and transportation as appropriate  - Identify discharge learning need Evaluate effectiveness of ordered antiemetic medications  - Provide nonpharmacologic comfort measures as appropriate  - Advance diet as tolerated, if ordered  - Obtain nutritional consult as needed  - Evaluate fluid balance  Outcome: Progressing    Goal: M prevention bundle as indicated  Outcome: Progressing    Goal: Oral mucous membranes remain intact  INTERVENTIONS  - Assess oral mucosa and hygiene practices  - Implement preventative oral hygiene regimen  - Implement oral medicated treatments as ordered  O

## 2018-03-31 NOTE — PROGRESS NOTES
[facility] Hospitalist Progress Note     CC: Hospital Follow up    PCP: Lluvia Manuel MD       Assessment/Plan:     Principal Problem:    Acute chest pain  Active Problems:    Acute kidney injury [facility])    Ms. Isac Miller is a 76 yo F with PM H of HTN, HL, commands    OBJECTIVE:    Blood pressure 98/70, pulse 97, temperature 101.5 °F (38.6 °C), resp. rate 16, height 162.6 cm (5' 4\"), weight 130 lb (59 kg), SpO2 100 %, not currently breastfeeding.     Temp:  [98 °F (36.7 °C)-101.5 °F (38.6 °C)] 101.5 °F (38.6 26  24   --   26       No results for input(s): ALT, AST, ALB, AMYLASE, LIPASE, LDH in the last 72 hours. Invalid input(s): ALPHOS, TBIL, DBIL, TPROT      Imaging:  Xr Chest Pa + Lat Chest (cpt=71046)    Result Date: 3/29/2018  CONCLUSION:  1.  No acute Oral Daily   • Vitamin C  500 mg Oral TID   • mupirocin  1 Application Nasal BID   • acetaminophen  1,000 mg Intravenous Q8H   • Clopidogrel Bisulfate  75 mg Oral Daily   • aspirin  81 mg Oral Daily   • ceFAZolin  2 g Intravenous Q8H   • Senna  8.6 mg Oral

## 2018-03-31 NOTE — ANESTHESIA POSTPROCEDURE EVALUATION
Patient: Jillian Plaster    Procedure Summary     Date:  03/30/18 Room / Location:  [address] [place] [address] MAIN OR 18 / [address] [place] [address] MAIN OR    Anesthesia Start:  9419 Anesthesia Stop:  2244    Procedure:  HEART CORONARY ARTERY BYPASS GRAFT (N/A ) Diagnosis:       Acute chest pain

## 2018-03-31 NOTE — PROGRESS NOTES
RESPIRATORY THERAPY MECHANICAL VENTILATION PROGRESS NOTE    Ventilator Weaning:  Patient meets criteria for weaning? yes Weaning was attempted yes.  wean fio2  To 40% and nitric- 30 ppm    Current Ventilator Data:       03/31/18 0725   Readings   Total RR 1

## 2018-04-01 ENCOUNTER — APPOINTMENT (OUTPATIENT)
Dept: GENERAL RADIOLOGY | Facility: HOSPITAL | Age: 68
DRG: 215 | End: 2018-04-01
Attending: THORACIC SURGERY (CARDIOTHORACIC VASCULAR SURGERY)
Payer: MEDICARE

## 2018-04-01 PROCEDURE — 71045 X-RAY EXAM CHEST 1 VIEW: CPT | Performed by: THORACIC SURGERY (CARDIOTHORACIC VASCULAR SURGERY)

## 2018-04-01 RX ORDER — METOCLOPRAMIDE HYDROCHLORIDE 5 MG/ML
5 INJECTION INTRAMUSCULAR; INTRAVENOUS EVERY 6 HOURS
Status: DISCONTINUED | OUTPATIENT
Start: 2018-04-02 | End: 2018-04-10

## 2018-04-01 RX ORDER — POTASSIUM CHLORIDE 29.8 MG/ML
40 INJECTION INTRAVENOUS ONCE
Status: COMPLETED | OUTPATIENT
Start: 2018-04-01 | End: 2018-04-01

## 2018-04-01 RX ORDER — MAGNESIUM SULFATE HEPTAHYDRATE 40 MG/ML
2 INJECTION, SOLUTION INTRAVENOUS ONCE
Status: COMPLETED | OUTPATIENT
Start: 2018-04-01 | End: 2018-04-01

## 2018-04-01 RX ORDER — DEXTROSE, SODIUM CHLORIDE, SODIUM LACTATE, POTASSIUM CHLORIDE, AND CALCIUM CHLORIDE 5; .6; .31; .03; .02 G/100ML; G/100ML; G/100ML; G/100ML; G/100ML
INJECTION, SOLUTION INTRAVENOUS
Status: DISPENSED
Start: 2018-04-01 | End: 2018-04-01

## 2018-04-01 RX ORDER — SODIUM CHLORIDE 0.9 % (FLUSH) 0.9 %
10 SYRINGE (ML) INJECTION AS NEEDED
Status: DISCONTINUED | OUTPATIENT
Start: 2018-04-01 | End: 2018-04-06

## 2018-04-01 RX ORDER — SODIUM CHLORIDE 9 MG/ML
INJECTION, SOLUTION INTRAVENOUS ONCE
Status: DISCONTINUED | OUTPATIENT
Start: 2018-04-01 | End: 2018-04-02

## 2018-04-01 RX ORDER — FAMOTIDINE 20 MG/1
20 TABLET ORAL DAILY
Status: DISCONTINUED | OUTPATIENT
Start: 2018-04-02 | End: 2018-04-13

## 2018-04-01 RX ORDER — FAMOTIDINE 10 MG/ML
20 INJECTION, SOLUTION INTRAVENOUS DAILY
Status: DISCONTINUED | OUTPATIENT
Start: 2018-04-02 | End: 2018-04-10

## 2018-04-01 RX ORDER — SODIUM CHLORIDE 9 MG/ML
INJECTION, SOLUTION INTRAVENOUS
Status: DISPENSED
Start: 2018-04-01 | End: 2018-04-01

## 2018-04-01 RX ORDER — SODIUM CHLORIDE 9 MG/ML
INJECTION, SOLUTION INTRAVENOUS
Status: COMPLETED
Start: 2018-04-01 | End: 2018-04-01

## 2018-04-01 NOTE — PLAN OF CARE
Problem: CARDIOVASCULAR - ADULT  Goal: Maintains optimal cardiac output and hemodynamic stability  INTERVENTIONS:  - Monitor vital signs, rhythm, and trends  - Monitor for bleeding, hypotension and signs of decreased cardiac output  - Evaluate effectivenes to report SOB or any respiratory difficulty  - Respiratory Therapy support as indicated  - Manage/alleviate anxiety  - Monitor for signs/symptoms of CO2 retention   Outcome: Progressing  REMAINS INTUBATED~FIO2@ 40%, WEANING NITRIC PER RESPIRATORY    Proble hemorrhage  - Monitor labs and vital signs for trends  - Administer supportive blood products/factors, fluids and medications as ordered and appropriate  - Administer supportive blood products/factors as ordered and appropriate   Outcome: Progressing  HGB

## 2018-04-01 NOTE — CM/SW NOTE
SHELLY received for s/p cardiac procedure protocol, possible hhc. Pt remains intubated. No family at bedside. SW provided resources for caregiver, snf list. SW will f/u once pt is stable and family are available.  SW will initiate tentative referral to [facility]

## 2018-04-01 NOTE — PROGRESS NOTES
[facility] PROGRESS NOTE  S/P emergent CABG x 2 on 3/30/18 after acute dissection of left main in cath lab by Dr Ailin Dennison POD#2; cardiogenic shock; impella plcmt in cath lab; v fib in cath lab prior to surgery     Arousable but sedated on ventilator with nitric oxide;

## 2018-04-01 NOTE — PROGRESS NOTES
04/01/18 0802   Readings   ETCO2 (mmHg) 34 mmHg   Total RR 19   Minute Ventilation (L/min) 7.2 L/min   Expiratory Tidal Volume 370 mL   PIP Observed (cm H2O) 22 cm H2O   MAP (cm H2O) 8   Plateau Pressure (cm H2O) 13 cm H2O   RESPIRATORY THERAPY

## 2018-04-01 NOTE — PROGRESS NOTES
[facility] Hospitalist Progress Note     CC: Hospital Follow up    PCP: Lethaniel Habermann, MD       Assessment/Plan:     Principal Problem:    Acute chest pain  Active Problems:    Acute kidney injury [facility])    Ms. Patricia Richardson is a 78 yo F with PM H of HTN, HL, use    Prophylaxis:   DVT with SCD  Lines/Monitors: cordis, chest tube x 2, reynolds    Dispo: pending course    Questions/concerns were discussed with patient and/or family by bedside.     Total Time spent with patient and coordinating care:  35 minutes    Gl 8.1   --   8.4   --   11.0   PLT  83*  80*  174   --   104*         Recent Labs   Lab  03/31/18   0557  03/31/18   1324  04/01/18   0527   GLU  106*  116*  205*   BUN  23*  24*  21*   CREATSERUM  1.60*  1.64*  1.53*   GFRAA  37*  36*  39*   GFRNAA  32*  31 sternotomy. Support tubes in satisfactory position. Normal heart size mild pulmonary congestive changes. Moderate left base consolidation/atelectasis with suspect effusion. Haziness right lung base may suggesting posterior layering right pleural fluid.  No insulin regular 1 Units/hr (04/01/18 0600)   • dexmedetomidine 1 mcg/kg/hr (04/01/18 0709)     Normal Saline Flush, [MAR Hold] BusPIRone HCl, [MAR Hold] TraMADol HCl, Normal Saline Flush, temazepam, DOBUTamine, Nitroglycerin in D5W, norepinephrine, nitropr

## 2018-04-01 NOTE — PLAN OF CARE
Problem: Patient/Family Goals  Goal: Patient/Family Long Term Goal  Patient's Long Term Goal: To feel better  Interventions:  - Heart healthy diet  - Medication compliance  - Testing/Angiogram  - See additional Care Plan goals for specific interventions ordered and protocol followed for replacement.  CPM     Problem: METABOLIC/FLUID AND ELECTROLYTES - ADULT  Goal: Hemodynamic stability and optimal renal function maintained  INTERVENTIONS:  - Monitor labs and assess for signs and symptoms of volume excess o family of reasons restraints applied  - Assess for any contributing factors to confusion (electrolyte disturbances, delirium, medications)  - Discontinue any unnecessary medical devices as soon as possible  - Assess the patient's physical comfort, circulat in discharge planning  - Arrange for needed discharge resources and transportation as appropriate  - Identify discharge learning needs (meds, wound care, etc)  - Arrange for interpreters to assist at discharge as needed  - Consider post-discharge preferenc ordered  - Evaluate effectiveness of ordered antiemetic medications  - Provide nonpharmacologic comfort measures as appropriate  - Advance diet as tolerated, if ordered  - Obtain nutritional consult as needed  - Evaluate fluid balance  Outcome: Progressing Pressure Ulcer prevention bundle as indicated  Outcome: Progressing     Goal: Oral mucous membranes remain intact  INTERVENTIONS  - Assess oral mucosa and hygiene practices  - Implement preventative oral hygiene regimen  - Implement oral medicated treatmen

## 2018-04-01 NOTE — PROGRESS NOTES
Leigha Pereira Patient Status:  Inpatient    3/25/1950 MRN [id number]   Location [facility] 2W/SW Attending Daly Juares MD   Hosp Day # 2 PCP Jody Street MD     Critical Care Progress Note      Assessment/Plan:  1.  Hypoxemic res HCl ER  240 mg Oral Daily       Vent Mode: VC/AC  FiO2 (%):  [40 %-50 %] 40 %  S RR:  [16] 16  S VT:  [400 mL] 400 mL  PEEP/CPAP (cm H2O):  [5 cm H20] 5 cm H20  MAP (cm H2O):  [7-8] 8    Intake/Output Summary (Last 24 hours) at 04/01/18 0580  Last data ladarius

## 2018-04-01 NOTE — DIETARY NOTE
NUTRITION NOTE-Education    Consult received for diet education per protocol. Education deferred until patient transferred out of CCU or until s/p intervention and when appropriate for teaching.     Salma Louise RD,LDN  [phone]

## 2018-04-01 NOTE — PROGRESS NOTES
ASSESSMENT/PLAN:     IMP  1. s/p emergent cabg for abrupt left main closure;  drips weaning; on impella and vent; Hemodynamics surprisingly good given situation    2. Resp failure on vent with fio2 50% and nitrous oxide    3.  Low plt/ coagulopathy  plt Onset   • Breast Cancer Other      2 mat aunts-age?    • Heart Disorder Father    • Hypertension Father    • Heart Disorder Mother    • Hypertension Mother    • Heart Disorder Daughter    • Lung Disorder Neg      Family History of premature CAD: n   Social pulses intact. EXT:no peripheral edema.  Right hand fingers bluish    LABORATORY DATA:   Tele paced  Labs reviewed  Gisela Mccarty MD

## 2018-04-01 NOTE — CONSULTS
Hematology/Oncology  Progress Note        NAME: Emmanuel Vann - ROOM: [facility] - MRN: [id number] - Age: 77year old - : 3/25/1950    Subjective:    Ms Amilcar Cr is a 77 y.o female with pmhx of htn, hpl currently admitted to ICU is s/p cardiac cath on 3/3 mcg/min (03/31/18 2112)   • nitroprusside (NIPRIDE) 50 mg in D5W infusion     • Dextrose in Lactated Ringers 40 mL/hr at 03/31/18 2107   • dexmedetomidine 1 mcg/kg/hr (04/01/18 0709)         Intake/Output Summary (Last 24 hours) at 04/01/18 1140  Last data 3.6   CL  111*  111*  106   CO2  26  24  23     Component      Latest Ref Rng & Units 4/1/2018 3/31/2018 3/30/2018   PT      11.8 - 14.5 seconds 17.9 (H) 14.3 16.1 (H)   INR      0.9 - 1.2 1.5 (H) 1.1 1.3 (H)     Component      Latest Ref Rng & Units 3/31/

## 2018-04-02 ENCOUNTER — SURGERY (OUTPATIENT)
Age: 68
End: 2018-04-02

## 2018-04-02 ENCOUNTER — ANESTHESIA (OUTPATIENT)
Dept: SURGERY | Facility: HOSPITAL | Age: 68
DRG: 215 | End: 2018-04-02
Payer: MEDICARE

## 2018-04-02 ENCOUNTER — ANESTHESIA EVENT (OUTPATIENT)
Dept: SURGERY | Facility: HOSPITAL | Age: 68
DRG: 215 | End: 2018-04-02
Payer: MEDICARE

## 2018-04-02 ENCOUNTER — APPOINTMENT (OUTPATIENT)
Dept: GENERAL RADIOLOGY | Facility: HOSPITAL | Age: 68
DRG: 215 | End: 2018-04-02
Attending: INTERNAL MEDICINE
Payer: MEDICARE

## 2018-04-02 PROCEDURE — 02PA3RZ REMOVAL OF SHORT-TERM EXTERNAL HEART ASSIST SYSTEM FROM HEART, PERCUTANEOUS APPROACH: ICD-10-PCS | Performed by: THORACIC SURGERY (CARDIOTHORACIC VASCULAR SURGERY)

## 2018-04-02 PROCEDURE — 04QK0ZZ REPAIR RIGHT FEMORAL ARTERY, OPEN APPROACH: ICD-10-PCS | Performed by: THORACIC SURGERY (CARDIOTHORACIC VASCULAR SURGERY)

## 2018-04-02 PROCEDURE — 71045 X-RAY EXAM CHEST 1 VIEW: CPT | Performed by: INTERNAL MEDICINE

## 2018-04-02 PROCEDURE — 36430 TRANSFUSION BLD/BLD COMPNT: CPT | Performed by: ANESTHESIOLOGY

## 2018-04-02 PROCEDURE — 93312 ECHO TRANSESOPHAGEAL: CPT | Performed by: ANESTHESIOLOGY

## 2018-04-02 RX ORDER — SODIUM CHLORIDE 9 MG/ML
INJECTION, SOLUTION INTRAVENOUS
Status: COMPLETED
Start: 2018-04-02 | End: 2018-04-02

## 2018-04-02 RX ORDER — SODIUM CHLORIDE 9 MG/ML
INJECTION, SOLUTION INTRAVENOUS CONTINUOUS PRN
Status: DISCONTINUED | OUTPATIENT
Start: 2018-04-02 | End: 2018-04-02 | Stop reason: SURG

## 2018-04-02 RX ORDER — BUPIVACAINE HYDROCHLORIDE 2.5 MG/ML
INJECTION, SOLUTION EPIDURAL; INFILTRATION; INTRACAUDAL AS NEEDED
Status: DISCONTINUED | OUTPATIENT
Start: 2018-04-02 | End: 2018-04-02 | Stop reason: HOSPADM

## 2018-04-02 RX ORDER — DOBUTAMINE HYDROCHLORIDE 100 MG/100ML
INJECTION INTRAVENOUS CONTINUOUS PRN
Status: DISCONTINUED | OUTPATIENT
Start: 2018-04-02 | End: 2018-04-02 | Stop reason: SURG

## 2018-04-02 RX ORDER — VECURONIUM BROMIDE 1 MG/ML
INJECTION, POWDER, LYOPHILIZED, FOR SOLUTION INTRAVENOUS AS NEEDED
Status: DISCONTINUED | OUTPATIENT
Start: 2018-04-02 | End: 2018-04-02 | Stop reason: SURG

## 2018-04-02 RX ORDER — PHENYLEPHRINE HCL 10 MG/ML
VIAL (ML) INJECTION AS NEEDED
Status: DISCONTINUED | OUTPATIENT
Start: 2018-04-02 | End: 2018-04-02 | Stop reason: SURG

## 2018-04-02 RX ORDER — HEPARIN SODIUM 1000 [USP'U]/ML
INJECTION, SOLUTION INTRAVENOUS; SUBCUTANEOUS AS NEEDED
Status: DISCONTINUED | OUTPATIENT
Start: 2018-04-02 | End: 2018-04-02 | Stop reason: SURG

## 2018-04-02 RX ADMIN — SODIUM CHLORIDE: 9 INJECTION, SOLUTION INTRAVENOUS at 13:19:00

## 2018-04-02 RX ADMIN — PHENYLEPHRINE HCL 100 MCG: 10 MG/ML VIAL (ML) INJECTION at 14:08:00

## 2018-04-02 RX ADMIN — HEPARIN SODIUM 5000 UNITS: 1000 INJECTION, SOLUTION INTRAVENOUS; SUBCUTANEOUS at 14:01:00

## 2018-04-02 RX ADMIN — SODIUM CHLORIDE: 9 INJECTION, SOLUTION INTRAVENOUS at 14:50:00

## 2018-04-02 RX ADMIN — VECURONIUM BROMIDE 10 MG: 1 INJECTION, POWDER, LYOPHILIZED, FOR SOLUTION INTRAVENOUS at 13:39:00

## 2018-04-02 RX ADMIN — PHENYLEPHRINE HCL 100 MCG: 10 MG/ML VIAL (ML) INJECTION at 14:34:00

## 2018-04-02 RX ADMIN — PHENYLEPHRINE HCL 100 MCG: 10 MG/ML VIAL (ML) INJECTION at 14:50:00

## 2018-04-02 RX ADMIN — SODIUM CHLORIDE: 9 INJECTION, SOLUTION INTRAVENOUS at 15:30:00

## 2018-04-02 RX ADMIN — Medication 50 ML: at 15:16:00

## 2018-04-02 RX ADMIN — DOBUTAMINE HYDROCHLORIDE 5 MCG/KG/MIN: 100 INJECTION INTRAVENOUS at 13:20:00

## 2018-04-02 NOTE — PROGRESS NOTES
RESPIRATORY THERAPY MECHANICAL VENTILATION PROGRESS NOTE    Ventilator Weaning:  Patient meets criteria for weaning? no Weaning was attempted no Current Ventilator Data:        Therapist recommendations:   RCP recommends     04/02/18 0730   Readings   Tota

## 2018-04-02 NOTE — PROGRESS NOTES
Hematology/Oncology  Progress Note        NAME: Joslyn Santillan - ROOM: 632/871-X - MRN: [id number] - Age: 77year old - : 3/25/1950    Subjective:  No acute events overnight.     Medications:  • sodium chloride   Intravenous Once   • ceFAZolin  1 g Intra unusual skin lesions  Eyes: denies blurred vision or double vision  HEENT: denies nasal congestion, sinus pain or ST , hard of hearing  Respiratory: no dyspnea, cough, wheezing  Cardiovascular: denies current chest pain  GI: denies abdominal pain, diarrhea 1.3     INR  - INR 3.1 on admission, this was probbaly taken during heparin during cath as PT and PTT were also elevated and suspect underlying shock liver at presentation  - now s/p multiple infusions of plasma, cryo and 4 units of platelets  - INR yester

## 2018-04-02 NOTE — ANESTHESIA PREPROCEDURE EVALUATION
Anesthesia PreOp Note    HPI:     Devika Kelley is a 77year old female who presents for preoperative consultation requested by: Felicitas Ruiz MD    Date of Surgery: 4/2/2018    Procedure(s):  Impella device removal  Indication: s/p Impella placement CATARACT WITH INTRAOCULAR LENS IMPLANT 14801;                 Surgeon:  Doris Lim MD;  Location: [address] [place] [address]      Prescriptions Prior to Admission:  lisinopril 5 MG Oral Tab Take 1 tablet (5 mg total) by mouth sodium chloride 0.9 % 500 mL with heparin sodium 5,000 Units (OR nurse to mix)   PRN Santa Mark MD     heparin sodium 1000 UNIT/ML injection  Intravenous PRN Papo Farias MD 5,000 Units at 04/02/18 1401    phenylephrine HCl (NIKKI-SYNEPHRINE) 1 fentaNYL citrate (SUBLIMAZE) 1,000 mcg in sodium chloride 0.9 % 100 mL infusion  mcg/hr Intravenous Continuous Nohemy Bailey MD Last Rate: 5 mL/hr at 04/02/18 0618 50 mcg/hr at 04/02/18 0618   propofol (DIPRIVAN) infusion 5-100 mcg/kg/min Intra [MAR Hold] metoprolol Tartrate (LOPRESSOR) tab 25 mg 25 mg Oral 2x Daily(Beta Blocker) Remington Wolff NP     [MAR Hold] milrinone (PRIMACOR) 20mg in D5W 100 mL premix infusion 0.375 mcg/kg/min Intravenous PRN Remington Wolff NP     [MAR Hold] PEG 3350 [MAR Hold] HYDROcodone-acetaminophen (NORCO) 5-325 MG per tab 2 tablet 2 tablet Oral Q4H PRN Moloney, Lesley Libman, NP     [MAR Hold] morphINE sulfate (PF) 2 MG/ML injection 2 mg 2 mg Intravenous Q2H PRN Lin Grimm NP 2 mg at 03/31/18 0131    Or Alc-Benzyl Alc-Sulf*    Rash, Itching  Bactrim [Sulfametho*      Ibuprofen                 Statins                   Zetia [Ezetimibe]           Family History   Problem Relation Age of Onset   • Breast Cancer Other      2 mat aunts-age?    • Heart Disorder height is 1.626 m (5' 4\") and weight is 63.3 kg (139 lb 9.6 oz). Her temperature is 99.8 °F (37.7 °C). Her blood pressure is 113/58 and her pulse is 74. Her respiration is 16 and oxygen saturation is 100%.     04/02/18  1000 04/02/18  1100 04/02/18  1200 I have informed Epi Sutherland  of the nature of the anesthetic plan, benefits, risks, major complications, and any alternative forms of anesthetic management. All of the patient's questions were answered to the best of my ability.  The patient desires th

## 2018-04-02 NOTE — PROGRESS NOTES
[facility]    Progress Note    Joan Rmaos Patient Status:  Inpatient    3/25/1950 MRN [id number]   Location [facility] 2W/SW Attending Ally Murillo MD   Hosp Day # 3 PCP Gilbert Farrar MD     Subjective:  Pt.  Int laterally  Chest tubes= 200cc/ 12 hours- no air leak   Heart: RRR, S1, S2  Abdomen: Soft, NT/ND, no BS, no BM. NGT to LIS= 175cc/12 hours  Extremities: Warm, dry, no LE edema bilat  Pulses: 2+ bilat DP  Skin: sternotomy drsg=CDI- TPW intact.  Left upper t

## 2018-04-02 NOTE — ANESTHESIA POSTPROCEDURE EVALUATION
Patient: Atiya Gamez    Procedure Summary     Date:  04/02/18 Room / Location:  [facility] OR  / [facility] OR    Anesthesia Start:  0017 Anesthesia Stop:  8280    Procedure:  FEMORAL REPAIR ARTERY (Right Groin) Diagnosis:       CAD (coronary artery disea

## 2018-04-02 NOTE — PROGRESS NOTES
[facility] Hospitalist Progress Note     CC: Hospital Follow up    PCP: Bhaskar Dao MD       Assessment/Plan:     Principal Problem:    Acute chest pain  Active Problems:    Acute kidney injury [facility])    Ms. Tenorio Backbone is a 78 yo F with PM H of HTN, HL, illicit drug use    Prophylaxis:   DVT with SCD  Lines/Monitors: cordis, chest tube x 2, reynolds    Dispo: pending course    Questions/concerns were discussed with patient and/or family by bedside.     Total Time spent with patient and coordinating care:  35 --    PLT  104*  133*  118*  105*         Recent Labs   Lab  04/01/18   0527  04/01/18   1757  04/02/18   0339   GLU  205*  88  97   BUN  21*  21*  20   CREATSERUM  1.53*  1.34  1.20   GFRAA  39*  46*  53*   GFRNAA  34*  40*  46*   CA  6.2*  6.7*  6.8*   N MD NAM on 3/31/2018 at 7:35     Approved by (CST): Ruben Ramirez MD on 3/31/2018 at 7:37          Xr Chest Ap Portable  (cpt=71045)    Result Date: 3/31/2018  CONCLUSION:  1. Postoperative changes from coronary bypass and sternotomy.  Support tubes in satis sodium chloride 40 mL/hr at 03/31/18 0700   • DOBUTamine 5 mcg/kg/min (04/02/18 0745)   • Nitroglycerin in D5W     • norepinephrine Stopped (04/01/18 1600)   • nitroprusside (NIPRIDE) 50 mg in D5W infusion     • Dextrose in Lactated Ringers 40 mL/hr at 04/

## 2018-04-02 NOTE — ANESTHESIA PROCEDURE NOTES
Procedure Performed: PARISH       Start Time:  4/2/2018 1:30 PM       End Time:   4/2/2018 4:30 PM    Preanesthesia Checklist:  Patient identified, IV assessed, risks and benefits discussed, monitors and equipment assessed, procedure being performed at [facility]

## 2018-04-02 NOTE — OPERATIVE REPORT
[facility] OPERATING ROOM  Operative Note     Moshe Patricia Location: OR   CSN [id number] MRN [id number]   Admission Date 3/29/2018 Operation Date 4/2/2018   Attending Physician Hayden Moser MD Operating Physician Ida Martinez MD      Pr just on 5 mics of dobutamine and some nitric oxide with minimal Impala flow. Timeout was performed antibiotics were administered. She had been induced and intubated prepped and draped.   A transverse incision was made above the skin entry site and then th

## 2018-04-02 NOTE — PLAN OF CARE
Problem: CARDIOVASCULAR - ADULT  Goal: Maintains optimal cardiac output and hemodynamic stability  INTERVENTIONS:  - Monitor vital signs, rhythm, and trends  - Monitor for bleeding, hypotension and signs of decreased cardiac output  - Evaluate effectivenes 1:1 interaction with staff   REMAINS SEDATED ON PROPOFOL, PRECEDEX & FENTANYL DRIPS    Problem: PAIN - ADULT  Goal: Verbalizes/displays adequate comfort level or patient's stated pain goal  INTERVENTIONS:  - Encourage pt to monitor pain and request assista Maintains or returns to baseline bowel function  INTERVENTIONS:  - Assess bowel function  - Maintain adequate hydration with IV or PO as ordered and tolerated  - Evaluate effectiveness of GI medications  - Encourage mobilization and activity  - Obtain nutr

## 2018-04-02 NOTE — PROGRESS NOTES
Critical Care Progress Note     Assessment / Plan:  1. Hypoxemic respiratory failure   - 2/2 cardiogenic shock  - full vent support   - wean O2 as able   - If nitric oxide comes off, consider SBT   2.  Cardiogenic shock  - dobutamine, impella, nitric oxide

## 2018-04-02 NOTE — PROGRESS NOTES
ASSESSMENT/PLAN:     IMP  1. s/p emergent cabg for abrupt left main closure;  drips weaning; on impella and vent; Hemodynamics surprisingly good given situation    2. Resp failure on vent with fio2 50% and nitrous oxide    3.  Low plt/ coagulopathy  plt of Onset   • Breast Cancer Other      2 mat aunts-age?    • Heart Disorder Father    • Hypertension Father    • Heart Disorder Mother    • Hypertension Mother    • Heart Disorder Daughter    • Lung Disorder Neg      Family History of premature CAD: n   Soci PEDAL:pedal pulses intact. EXT:no peripheral edema.  Right hand fingers bluish    LABORATORY DATA:   Tele nsr  Labs reviewed  Adriano Wilson MD

## 2018-04-02 NOTE — ADDENDUM NOTE
Addendum  created 04/02/18 1736 by Mabel Solares MD    Anesthesia Review and Sign - Ready for Procedure, Anesthesia Review and Sign - Signed

## 2018-04-02 NOTE — PLAN OF CARE
ANXIETY    • Will report anxiety at manageable levels Progressing        CARDIOVASCULAR - ADULT    • Maintains optimal cardiac output and hemodynamic stability Progressing    • Absence of cardiac arrhythmias or at baseline Progressing        COPING    • Pt incisions with surgical dressings CDI. Left leg incision with noted bruising. Ct to suction, minimal output. Ng to LIS, Petres with clear yellow, good output.

## 2018-04-02 NOTE — PROGRESS NOTES
[facility] Pharmacy Note:  Renal Dose Adjustment for Metoclopramide (REGLAN) and Famotidine (PEPCID)    Bethgilma Garcia has been prescribed Metoclopramide (REGLAN) 10 mg every 6 hours and famotidine 20mg PO/IV q12hrs.     Estimated Creatinine Clearance: 33.7 mL/min

## 2018-04-03 ENCOUNTER — APPOINTMENT (OUTPATIENT)
Dept: GENERAL RADIOLOGY | Facility: HOSPITAL | Age: 68
DRG: 215 | End: 2018-04-03
Attending: INTERNAL MEDICINE
Payer: MEDICARE

## 2018-04-03 PROCEDURE — 71045 X-RAY EXAM CHEST 1 VIEW: CPT | Performed by: INTERNAL MEDICINE

## 2018-04-03 PROCEDURE — 0BP1XDZ REMOVAL OF INTRALUMINAL DEVICE FROM TRACHEA, EXTERNAL APPROACH: ICD-10-PCS | Performed by: INTERNAL MEDICINE

## 2018-04-03 PROCEDURE — 02HV33Z INSERTION OF INFUSION DEVICE INTO SUPERIOR VENA CAVA, PERCUTANEOUS APPROACH: ICD-10-PCS | Performed by: THORACIC SURGERY (CARDIOTHORACIC VASCULAR SURGERY)

## 2018-04-03 PROCEDURE — B548ZZA ULTRASONOGRAPHY OF SUPERIOR VENA CAVA, GUIDANCE: ICD-10-PCS | Performed by: THORACIC SURGERY (CARDIOTHORACIC VASCULAR SURGERY)

## 2018-04-03 RX ORDER — ALBUMIN, HUMAN INJ 5% 5 %
250 SOLUTION INTRAVENOUS ONCE
Status: COMPLETED | OUTPATIENT
Start: 2018-04-03 | End: 2018-04-03

## 2018-04-03 RX ORDER — ASPIRIN 81 MG/1
81 TABLET ORAL DAILY
Status: DISCONTINUED | OUTPATIENT
Start: 2018-04-03 | End: 2018-04-13

## 2018-04-03 RX ORDER — SODIUM CHLORIDE 0.9 % (FLUSH) 0.9 %
10 SYRINGE (ML) INJECTION AS NEEDED
Status: DISCONTINUED | OUTPATIENT
Start: 2018-04-03 | End: 2018-04-06

## 2018-04-03 RX ORDER — ALBUMIN, HUMAN INJ 5% 5 %
SOLUTION INTRAVENOUS
Status: COMPLETED
Start: 2018-04-03 | End: 2018-04-03

## 2018-04-03 RX ORDER — LIDOCAINE HYDROCHLORIDE 10 MG/ML
0.5 INJECTION, SOLUTION INFILTRATION; PERINEURAL ONCE AS NEEDED
Status: ACTIVE | OUTPATIENT
Start: 2018-04-03 | End: 2018-04-03

## 2018-04-03 RX ORDER — SODIUM CHLORIDE 9 MG/ML
INJECTION, SOLUTION INTRAVENOUS ONCE
Status: COMPLETED | OUTPATIENT
Start: 2018-04-03 | End: 2018-04-03

## 2018-04-03 RX ORDER — LORAZEPAM 0.5 MG/1
1 TABLET ORAL EVERY 6 HOURS PRN
Status: DISCONTINUED | OUTPATIENT
Start: 2018-04-03 | End: 2018-04-08

## 2018-04-03 RX ORDER — SODIUM CHLORIDE 9 MG/ML
INJECTION, SOLUTION INTRAVENOUS
Status: DISPENSED
Start: 2018-04-03 | End: 2018-04-03

## 2018-04-03 RX ORDER — SODIUM CHLORIDE 0.9 % (FLUSH) 0.9 %
10 SYRINGE (ML) INJECTION AS NEEDED
Status: DISCONTINUED | OUTPATIENT
Start: 2018-04-03 | End: 2018-04-13

## 2018-04-03 NOTE — PROGRESS NOTES
Ancef (cefazolin) dosing had been previously adjusted by pharmacy due to renal insufficiency [facility]. Estimated Creatinine Clearance: 48.6 mL/min (based on SCr of 0.93 mg/dL).     Due to improved renal function, the dose of Ancef (cefazolin) h

## 2018-04-03 NOTE — PROGRESS NOTES
[facility] Hospitalist Progress Note     CC: Hospital Follow up    PCP: Sharon Faust MD       Assessment/Plan:     Principal Problem:    Acute chest pain  Active Problems:    Acute kidney injury [facility])    Ms. Patience Rivera is a 78 yo F with PM H of HTN, HL, tramadol chronically and morphine when her PCP prescribes, no other illicit drug use    Prophylaxis:   DVT with SCD  Lines/Monitors: cordis, chest tube x 2, reynolds    Dispo: pending course    Questions/concerns were discussed with patient and/or family by mandie 30.0   MCHC  34.2   --   34.4  34.9   RDW  16.0*   --   15.9*  16.3*   WBC  9.6   --   10.3  10.4   PLT  118*  105*  94*  110*         Recent Labs   Lab  04/02/18   0339  04/02/18   1700  04/03/18   0606   GLU  97  131*  132*   BUN  20  18  15   CREATSERUM superimposed infection. 2. Chronic reticulonodular opacities at the periphery of the right upper lobe, likely reflecting small airways infection. 3. Post thoracotomy. 4. Stable radiographic appearance of support apparatus.      Dictated by (CST): Corinne Obando Dextrose, PEG 3350, bisacodyl, FLEET ENEMA, ondansetron HCl, potassium chloride **OR** potassium chloride, Magnesium Sulfate in D5W, Magnesium Sulfate, acetaminophen **OR** HYDROcodone-acetaminophen **OR** HYDROcodone-acetaminophen, morphINE sulfate **OR**

## 2018-04-03 NOTE — RESPIRATORY THERAPY NOTE
Patient extubated and placed on HF NC at 15L. Doing very well post extubation. No upper airway stridor heard.

## 2018-04-03 NOTE — PROGRESS NOTES
Vascular Access Note    Vascular Access Screening:   Allergies to Lidocaine: no  Allergies to Latex: no  Presence of Pacemaker/Defibrillator: No  Mastectomy with Lymph Node Dissection: Right Side  AV Fistula / AV Graft: No  Dialysis Catheter: No  [facility]

## 2018-04-03 NOTE — PROGRESS NOTES
[facility]    Progress Note    Devika Kelley Patient Status:  Inpatient    3/25/1950 MRN [id number]   Location [facility] 2W/SW Attending Sveta Augustin MD   Hosp Day # 4 PCP Sharon Faust MD     Subjective:  Pt. Cur hours  Extremities: Warm, dry, no LE edema bilat  Pulses: 2+ bilat DP  Skin: sternotomy incision drsg: CDI- TPW intact. Left leg upper thigh incision drsgs: CDI.  Right femoral Impella site with Provena drsg=CDI  Neurological:  Lightly sedated; arousable, c

## 2018-04-03 NOTE — SLP NOTE
ADULT SWALLOWING EVALUATION    ASSESSMENT    ASSESSMENT/OVERALL IMPRESSION:  Pt seen with family (grand-daughter and two friends) at bedside for BSSE. Pt recently extubated post surgery.  Pt with audible phonation and without report of odynophagia during se extubation)    Problem List  Principal Problem:    Acute chest pain  Active Problems:    Acute kidney injury [facility])      Past Medical History  Past Medical History:   Diagnosis Date   • Anxiety 3/15/2010   • Atherosclerosis of aorta ([facility] [address] 1/9/2018   • JEFF reassess  Number of Visits to Meet Established Goals: 3  Follow Up Needed: Yes  SLP Follow-up Date: 04/04/18    Thank you for your referral.   Tami Will MA, [address] Pathologist  [place]. [id number]    If you hav

## 2018-04-03 NOTE — PROGRESS NOTES
Hematology/Oncology  Progress Note        NAME: Ricardo Bowling - ROOM: 587/252-X - MRN: [id number] - Age: 77year old - : 3/25/1950    Subjective:  impella removed yesterday     Medications:  • sodium chloride       • ceFAZolin  1 g Intravenous Q12H   • hematuria   MSK: denies back pain  Neuro: denies headaches, no strokes or seizure history  Psyche: denies depression or anxiety  Hematologic: per HPI  Endocrine: denies thyroid history      Physical Exam:  BP 97/55 (BP Location: Left arm)   Pulse 75   Temp disease  - transfuse 2 units prbc today - will get significant iron load from this  - low haptoglobin noted- however this was done on impella      Thrombocytopenia  - plts 110- stable   - not in DIC   - improving from prior  - no need for transfusion  - tr

## 2018-04-03 NOTE — PROGRESS NOTES
Refused skin PI this am.  Patient unstable at this time. Per Chloe PARADA APN may come back later in the afternoon should patient be stable and appropriate.

## 2018-04-03 NOTE — PROGRESS NOTES
ASSESSMENT/PLAN:     IMP  1. s/p emergent cabg for abrupt left main closure; Off pressors and impella; responding to voice    2. Resp failure on vent with fio2 50%: off nitrous oxide    3.  Low plt/ coagulopathy  plt better overnight' appreciate heme inp 2 mat aunts-age?    • Heart Disorder Father    • Hypertension Father    • Heart Disorder Mother    • Hypertension Mother    • Heart Disorder Daughter    • Lung Disorder Neg      Family History of premature CAD: n   Social History: Smoking status: Former peripheral edema.  Right hand fingers bluish    LABORATORY DATA:   Tele nsr  Labs reviewed  Saran Harris MD

## 2018-04-03 NOTE — PLAN OF CARE
Problem: METABOLIC/FLUID AND ELECTROLYTES - ADULT  Goal: Hemodynamic stability and optimal renal function maintained  INTERVENTIONS:  - Monitor labs and assess for signs and symptoms of volume excess or deficit  - Monitor intake, output and patient weight medications)  - Discontinue any unnecessary medical devices as soon as possible  - Assess the patient's physical comfort, circulation, skin condition, hydration, nutrition and elimination needs   - Reorient and redirection as needed  - Assess for the need handout, if applicable  - Encourage broncho-pulmonary hygiene including cough, deep breathe, Incentive Spirometry  - Assess the need for suctioning and perform as needed  - Assess and instruct to report SOB or any respiratory difficulty  - Respiratory Ther ADULT  Goal: Maintains hematologic stability  INTERVENTIONS  - Assess for signs and symptoms of bleeding or hemorrhage  - Monitor labs and vital signs for trends  - Administer supportive blood products/factors, fluids and medications as ordered and appropr

## 2018-04-03 NOTE — PROGRESS NOTES
Pulmonary Progress Note      NAME: Emmanuel Vann - ROOM: [address] [place] [address] - MRN: [id number] - Age: 77year old - : 3/25/1950    Assessment/Plan:  1.  Hypoxemic respiratory failure   - plan for t-piece after blood transfusion  - 2/2 cardiogenic shock  - full ve respiratory distress. HEENT: Normocephalic atraumatic. Lips, mucosa, and tongue normal.  No thrush noted. Lungs: mechanical breath sounds   Chest wall: dressing noted, chest tubes in place   Heart: Regular rate and rhythm, normal S1S2, II/VI murmur.    A

## 2018-04-04 ENCOUNTER — APPOINTMENT (OUTPATIENT)
Dept: GENERAL RADIOLOGY | Facility: HOSPITAL | Age: 68
DRG: 215 | End: 2018-04-04
Attending: THORACIC SURGERY (CARDIOTHORACIC VASCULAR SURGERY)
Payer: MEDICARE

## 2018-04-04 PROCEDURE — 71045 X-RAY EXAM CHEST 1 VIEW: CPT | Performed by: THORACIC SURGERY (CARDIOTHORACIC VASCULAR SURGERY)

## 2018-04-04 RX ORDER — POTASSIUM CHLORIDE 20 MEQ/1
40 TABLET, EXTENDED RELEASE ORAL 2 TIMES DAILY
Status: DISCONTINUED | OUTPATIENT
Start: 2018-04-04 | End: 2018-04-07

## 2018-04-04 RX ORDER — LORAZEPAM 2 MG/ML
1 INJECTION INTRAMUSCULAR EVERY 4 HOURS PRN
Status: DISCONTINUED | OUTPATIENT
Start: 2018-04-04 | End: 2018-04-06

## 2018-04-04 RX ORDER — FUROSEMIDE 10 MG/ML
40 INJECTION INTRAMUSCULAR; INTRAVENOUS
Status: DISCONTINUED | OUTPATIENT
Start: 2018-04-04 | End: 2018-04-04

## 2018-04-04 RX ORDER — DEXTROSE MONOHYDRATE 25 G/50ML
25 INJECTION, SOLUTION INTRAVENOUS AS NEEDED
Status: DISCONTINUED | OUTPATIENT
Start: 2018-04-04 | End: 2018-04-13

## 2018-04-04 RX ORDER — DEXTROSE MONOHYDRATE 25 G/50ML
INJECTION, SOLUTION INTRAVENOUS
Status: COMPLETED
Start: 2018-04-04 | End: 2018-04-04

## 2018-04-04 RX ORDER — FUROSEMIDE 10 MG/ML
40 INJECTION INTRAMUSCULAR; INTRAVENOUS 3 TIMES DAILY
Status: DISCONTINUED | OUTPATIENT
Start: 2018-04-04 | End: 2018-04-06

## 2018-04-04 RX ORDER — DEXTROSE AND SODIUM CHLORIDE 5; .45 G/100ML; G/100ML
INJECTION, SOLUTION INTRAVENOUS CONTINUOUS
Status: DISCONTINUED | OUTPATIENT
Start: 2018-04-04 | End: 2018-04-06

## 2018-04-04 NOTE — TRANSITION NOTE
2251-pt diaphoretic,cont sob with resp rate 30's. [facility] pulmonary paged, dr Lucie Bourne noturnalist paged and came to bs; dr Jatin Limon on call for dr Joslyn Rojas notified of above. cxr done and read by [facility] dr Varun Garcia acute changes.   vapotherm ordered pe

## 2018-04-04 NOTE — PROGRESS NOTES
ASSESSMENT/PLAN:     IMP  1. s/p emergent cabg for abrupt left main closure; Off pressors and impella; responding to voice    2. Resp failure on vent with fio2 50%: off nitrous oxide    3.  Low plt/ coagulopathy  plt better overnight' appreciate heme inp • Hypertension Father    • Heart Disorder Mother    • Hypertension Mother    • Heart Disorder Daughter    • Lung Disorder Neg      Family History of premature CAD: n   Social History: Smoking status: Former Smoker warm    LABORATORY DATA:   Tele nsr  Labs reviewed  Erika Berumen MD

## 2018-04-04 NOTE — PLAN OF CARE
Safety Risk - Non-Violent Restraints    • Patient will remain free from self-harm Completed    Patient was extubated this afternoon and does not need any restraints. Cooperative with care.       ANXIETY    • Will report anxiety at manageable levels Not Pro

## 2018-04-04 NOTE — PROGRESS NOTES
[facility] Hospitalist Progress Note     CC: Hospital Follow up    PCP: Mariela Santillan MD       Assessment/Plan:     Principal Problem:    Acute chest pain  Active Problems:    Acute kidney injury [facility])    Ms. David Gee is a 76 yo F with PM H of HTN, HL, UA and ucx, BCX NGTD  - continue IV cefazolin per CV surgery    Hx breast cancer  - s/p R lumpectomy and radiation  - States she cannot have blood draw on R arm    Chronic R shoulder pain  - takes tramadol chronically and morphine when her PCP prescribes, 2.17*  4.36   HGB  9.4*  6.5*  12.9   HCT  27.4*  18.6*  38.3   MCV  86.3  86.1  87.9   MCH  29.7  30.0  29.6   MCHC  34.4  34.9  33.6   RDW  15.9*  16.3*  15.9*   WBC  10.3  10.4  15.6*   PLT  94*  110*  153         Recent Labs   Lab  04/02/18   1700  04/ be within the right ventricle. Other findings are not significantly changed.   Dictated by (CST): Fredy Lares MD on 4/03/2018 at 10:55     Approved by (CST): Fredy Lares MD on 4/03/2018 at 10:58          Xr Chest Ap Portable  (cpt=71045)    Res mL Mouth/Throat [email or web address]     • dexmedetomidine Stopped (04/04/18 1100)   • Dextrose-NaCl     • EPINEPHrine (ADRENALIN) infusion Stopped (03/31/18 0430)   • norepinephrine 2 mcg/min (03/30/18 1930)   • DOBUTamine Stopped (04/03/18 1216)   • Nitroglycer

## 2018-04-04 NOTE — PLAN OF CARE
Problem: Patient/Family Goals  Goal: Patient/Family Long Term Goal  Patient's Long Term Goal: To feel better  Interventions:  - Heart healthy diet  - Medication compliance  - Testing/Angiogram  - See additional Care Plan goals for specific interventions ordered  - Monitor response to interventions for patient's volume status, including labs, urine output, blood pressure (other measures as available)  - Encourage oral intake as appropriate  - Instruct patient on fluid and nutrition restrictions as appropri fever/infection during anticipated neutropenic period  INTERVENTIONS  - Monitor WBC  - Administer growth factors as ordered  - Implement neutropenic guidelines   Outcome: Progressing      Problem: DISCHARGE PLANNING  Goal: Discharge to home or other facili or PO as ordered and tolerated  - Nasogastric tube to low intermittent suction as ordered  - Evaluate effectiveness of ordered antiemetic medications  - Provide nonpharmacologic comfort measures as appropriate  - Advance diet as tolerated, if ordered  - Ob prevention bundle as indicated   Outcome: Progressing    Goal: Oral mucous membranes remain intact  INTERVENTIONS  - Assess oral mucosa and hygiene practices  - Implement preventative oral hygiene regimen  - Implement oral medicated treatments as ordered

## 2018-04-04 NOTE — SLP NOTE
Pt with decline in status and now on bipap. Not appropriate for re-evaluation. Will follow in PM as pt appropriate.    Thank you,  Vishal Lopez MA, [address] Pathologist  [facility]. [phone]

## 2018-04-04 NOTE — PROGRESS NOTES
Hematology/Oncology  Progress Note        NAME: Bella Chu - ROOM: [address] [place] [address] - MRN: [id number] - Age: 77year old - : 3/25/1950    Subjective:  Extubated yesterday, confused, but significant clinical improvement     Medications:  • furosemide  40 mg per HPI  Endocrine: denies thyroid history      Physical Exam:  BP (!) 128/106 (BP Location: Left arm)   Pulse 83   Temp 98.5 °F (36.9 °C) (Temporal)   Resp 20   Ht 1.626 m (5' 4\")   Wt 65 kg (143 lb 6.4 oz)   SpO2 98%   BMI 24.61 kg/m²   Physical Exam: diuresis  - hgb 12.9 today  - irion studies with mixed picture, low iron stores and anemia of chronic disease  - transfuse 2 units prbc yesterday - will get significant iron load from this  - low haptoglobin noted- however this was done on impella      Thr

## 2018-04-04 NOTE — PROGRESS NOTES
Critical Care Progress Note     Assessment / Plan:  1. Hypoxemic respiratory failure - Extubated 4/3. CXR with worsening pulmonary edema  - start bipap  - wean FiO2 as able  - aggressive diuresis  2.  Cardiogenic shock  - off dobutamine  - weaned off impell

## 2018-04-04 NOTE — CM/SW NOTE
4/4/18  Discharge planning   Spoke with dtr Leyda Holcomb via phone ([phone]), discharge planning discussed and short term rehab options provided. Dtr is considering rehab at [facility], [facility] tomorrow.   Referral and medical records faxed to Carlene Roche

## 2018-04-04 NOTE — OCCUPATIONAL THERAPY NOTE
Attempted to see patient this AM for OT evaluation. Per RN, patient is not appropriate for therapy today and was transitioned to BiPAP. Will follow up w/ POC tomorrow as appropriate. RN aware.

## 2018-04-04 NOTE — PHYSICAL THERAPY NOTE
Attempted to see patient for physical therapy evaluation. Per RN patient put on BiPAP, possible intubation if not successful on BiPAP. Will attempt to see patient tomorrow as time permits and as patient is appropriate. RN aware.

## 2018-04-04 NOTE — PROGRESS NOTES
[facility]    Progress Note    Bri Cruz Patient Status:  Inpatient    3/25/1950 MRN [id number]   Location [facility] 2W/SW Attending Naima Burrows MD   Hosp Day # 5 PCP Zan Anderson MD     Subjective:  Pt. Eliseo Canseco leak noted  Heart: RRR, S1, S2  Abdomen: Soft, NT/ND, BS+x4 diminished, +flatus,no BM   Extremities: slightly cool, dry, 1+ LE edema/ Right LE w/o edema. Bilaterally upper ext/hand w/mild edema noted.    Pulses: 2+ bilat DP  Skin: sternotomy incision drsg: Hb improved after 2 units PRBCs yesterday. Plan to recheck CBC later this afternoon. Expected post op volume overload- wgt significantly up from pre-op. Lasix BID ordered. Adding Potassium 40meq BID while on Lasix. Keep ICU status due to tenuous resp.

## 2018-04-05 NOTE — PROGRESS NOTES
[facility] Pharmacy Note:  Renal Adjustment for cefazolin (ANCEF)    Ricardo Bowling is a 77year old female who has been prescribed cefazolin (ANCEF) 1000 mg every 8 hrs. CrCl is estimated creatinine clearance is 33 mL/min (based on SCr of 1.37 mg/dL).  so the do

## 2018-04-05 NOTE — CM/SW NOTE
SW received call from [facility] who confirmed they are in network with the pt's insurance ([employer]). Pt had not yet been stable for therapy, requiring bipap. GRIS will send therapy evaluations to the snf once obtained.     JOHANNY garcia [id number]

## 2018-04-05 NOTE — SLP NOTE
ADULT SWALLOWING Re-EVALUATION    ASSESSMENT    ASSESSMENT/OVERALL IMPRESSION:  Pt seen sitting upright in bed for re-evaluation. Pt on vapotherm and appeared with increased alertness when compared to 4/4. Pt's daughter present during evaluation.  SLP provi 3/15/2010   • Refusal of statin medication by patient 4/29/2014       Prior Living Situation: Home alone  Diet Prior to Admission: Regular; Thin liquids  Precautions: Aspiration    Patient/Family Goals: To resume PO nutrition.      SWALLOWING HISTORY  Geneva Izaguirre independently over 3 session(s).     In Progress   Goal #3 The patient will utilize compensatory strategies as outlined by  BSSE (clinical evaluation) including Slow rate, Small bites, Small sips, Multiple swallows, Alternate liquids/solids, No straws, Upri

## 2018-04-05 NOTE — PROGRESS NOTES
04/05/18 1748   Clinical Encounter Type   Visited With Patient and family together  (granddaughter present)   Routine Visit Introduction  (CONSULT)   Continue Visiting Yes   Patient Spiritual Encounters   Spiritual Assessment Completed 2  (Pt declined v

## 2018-04-05 NOTE — PHYSICAL THERAPY NOTE
PHYSICAL THERAPY EVALUATION - INPATIENT     Room Number: 230/230-A  Evaluation Date: 4/5/2018  Type of Evaluation: Initial   Physician Order: PT Eval and Treat    Presenting Problem: emergent CABG x 2  Reason for Therapy: Mobility Dysfunction and Discharg pain  Active Problems:    Acute kidney injury [facility])      Past Medical History  Past Medical History:   Diagnosis Date   • Anxiety 3/15/2010   • Atherosclerosis of aorta ([facility] [address] 1/9/2018   • CANCER    • Dyslipidemia 3/15/2010   • Essential hypertension, hyper limits    RANGE OF MOTION AND STRENGTH ASSESSMENT    Lower extremity ROM is within functional limits BLE WNL    Lower extremity strength is within functional limits BLE WNL    BALANCE  Static Sitting: Good  Dynamic Sitting: Fair +  Static Standing: Fair - Stand at assistance level: supervision with walker - rolling     Goal #2  Current Status    Goal #3 Patient is able to ambulate 100 feet with assist device: walker - rolling at assistance level: supervision   Goal #3   Current Status    Goal #4    Goal #4

## 2018-04-05 NOTE — PROGRESS NOTES
Misc. Note    Leonardo Law NP  2018  [facility]    Progress Note    Devika Hamiltonkamlesh Patient Status:  Inpatient    3/25/1950 MRN [id number]   [facility] 2W/SW Attending Tami Walker, [address] [facility] Day # 6 PCP Bert Mustafa oriented X 3, follows commands; moves all extremities; slight confusion  Psychiatric: calm  Sternum Incision dressing C/D/I; Right groin prevena vac in place C/D/I;left leg incision dressing C/D/I  Pulmonary: right scattered few faint rales; left bronchove 04/05/2018    (L) 04/05/2018   CREATSERUM 1.37 04/05/2018   BUN 22 (H) 04/05/2018    (H) 04/05/2018   K 3.7 04/05/2018    (H) 04/05/2018   CO2 25 04/05/2018    (H) 04/05/2018   CA 8.1 (L) 04/05/2018   ALB 2.7 (L) 04/01/2018   ALKP

## 2018-04-05 NOTE — PLAN OF CARE
ANXIETY    • Will report anxiety at manageable levels Not Progressing        COPING    • Pt/Family able to verbalize concerns and demonstrate effective coping strategies Not Progressing        GASTROINTESTINAL - ADULT    • Maintains or returns to baseline

## 2018-04-05 NOTE — OCCUPATIONAL THERAPY NOTE
OCCUPATIONAL THERAPY EVALUATION - INPATIENT     Room Number: 015/534-N  Evaluation Date: 4/5/2018  Type of Evaluation: Initial  Presenting Problem: cabg    Physician Order: IP Consult to Occupational Therapy  Reason for Therapy: ADL/IADL Dysfunction and [place] Memory kidney injury [facility])      Past Medical History  Past Medical History:   Diagnosis Date   • Anxiety 3/15/2010   • Atherosclerosis of aorta ([facility] [address] 1/9/2018   • CANCER    • Dyslipidemia 3/15/2010   • Essential hypertension, hypertension with unspecified goal 5/ oriented    Behavioral/Emotional/Social: appropriate for situation    RANGE OF MOTION   Upper extremity ROM is within functional limits     STRENGTH ASSESSMENT  Upper extremity strength is grossly fair/fair-. Pt initially observed to have difficulty manipu with supervision  Comment:     Patient will complete le dressing with supervision  Comment:     Patient will tolerate standing for 3 minutes w/ supervision in prep for adls   Comment:    Patient will incorporate sternal precautions into functional tasks wi

## 2018-04-05 NOTE — PROGRESS NOTES
[facility] Hospitalist Progress Note     CC: Hospital Follow up    PCP: Staci Lowe MD       Assessment/Plan:     Principal Problem:    Acute chest pain  Active Problems:    Acute kidney injury [facility])    Ms. Tamir Roy is a 78 yo F with PM H of HTN, HL, Darius  - continue IV cefazolin per CV surgery    Hx breast cancer  - s/p R lumpectomy and radiation  - States she cannot have blood draw on R arm    Chronic R shoulder pain  - takes tramadol chronically and morphine when her PCP prescribes, no other ill Labs   Lab  04/04/18   0250  04/04/18   1603  04/05/18   0541   RBC  4.36  4.13  3.71   HGB  12.9  12.2  11.0*   HCT  38.3  36.3  32.3*   MCV  87.9  87.9  87.2   MCH  29.6  29.5  29.8   MCHC  33.6  33.5  34.1   RDW  15.9*  16.2*  15.4*   WBC  15.6*  13.6* 4/3/2018  CONCLUSION:   Left-sided PICC line tip projects over the mid-distal SVC. The [place] catheter is now coiled in the right atrium. The tip may be within the right ventricle. Other findings are not significantly changed.   Dictated by (CST): Roberta Oral 2x Daily(Beta Blocker)   • multivitamin  1 tablet Oral Daily   • Vitamin C  500 mg Oral TID   • Senna  8.6 mg Oral BID   • Chlorhexidine Gluconate  15 mL Mouth/Throat Rony@"[employer] - The Simple[employer] Fast [employer] Elegant Tablet [employer] App"     • dexmedetomidine 0.4 mcg/kg/hr (04/05/18 0800)   • Dextrose-NaCl

## 2018-04-05 NOTE — SPIRITUAL CARE NOTE
Attempted to visit Pt, but did not because PT engaged in conversation with visitors. Will try to visit again later today.

## 2018-04-05 NOTE — PROGRESS NOTES
Critical Care Progress Note     Assessment / Plan:  1. Hypoxemic respiratory failure - Extubated 4/3. CXR with worsening pulmonary edema  - vapotherm during the day. Bipap at night  - wean FiO2 as able  - agree with diuresis  - IS as able  - ezpap  2.  Card

## 2018-04-05 NOTE — PROGRESS NOTES
ASSESSMENT/PLAN:     IMP  1. s/p emergent cabg for abrupt left main closure; Off pressors and impella; responding to voice    2. Resp failure on vent with fio2 50%: off nitrous oxide; volume overload    3.  Low plt/ coagulopathy  plt better overnight' ap [place]   Family History   Problem Relation Age of Onset   • Breast Cancer Other      2 mat aunts-age?    • Heart Disorder Father    • Hypertension Father    • Heart Disorder Mother    • Hypertension Mother    • Heart Disorder Daughter    • Lung Disorde pulses normal. ABD AORTA:not enlarged. FEM:femoral pulses intact. PEDAL:pedal pulses intact. EXT:no peripheral edema.  Warm  Wounds ok    LABORATORY DATA:   Tele nsr  Labs reviewed  Daniel Pfeiffer MD

## 2018-04-06 ENCOUNTER — APPOINTMENT (OUTPATIENT)
Dept: GENERAL RADIOLOGY | Facility: HOSPITAL | Age: 68
DRG: 215 | End: 2018-04-06
Attending: HOSPITALIST
Payer: MEDICARE

## 2018-04-06 PROCEDURE — 74230 X-RAY XM SWLNG FUNCJ C+: CPT | Performed by: HOSPITALIST

## 2018-04-06 RX ORDER — CLOPIDOGREL BISULFATE 75 MG/1
75 TABLET ORAL DAILY
Status: DISCONTINUED | OUTPATIENT
Start: 2018-04-07 | End: 2018-04-06

## 2018-04-06 RX ORDER — ENALAPRIL MALEATE 5 MG/1
5 TABLET ORAL 2 TIMES DAILY
Status: DISCONTINUED | OUTPATIENT
Start: 2018-04-06 | End: 2018-04-09

## 2018-04-06 RX ORDER — CLOPIDOGREL BISULFATE 75 MG/1
75 TABLET ORAL DAILY
Status: DISCONTINUED | OUTPATIENT
Start: 2018-04-06 | End: 2018-04-09

## 2018-04-06 RX ORDER — MAGNESIUM SULFATE 1 G/100ML
1 INJECTION INTRAVENOUS ONCE
Status: COMPLETED | OUTPATIENT
Start: 2018-04-06 | End: 2018-04-06

## 2018-04-06 RX ORDER — METOPROLOL TARTRATE 50 MG/1
50 TABLET, FILM COATED ORAL
Status: DISCONTINUED | OUTPATIENT
Start: 2018-04-06 | End: 2018-04-13

## 2018-04-06 RX ORDER — ENOXAPARIN SODIUM 100 MG/ML
40 INJECTION SUBCUTANEOUS DAILY
Status: DISCONTINUED | OUTPATIENT
Start: 2018-04-06 | End: 2018-04-07

## 2018-04-06 RX ORDER — FUROSEMIDE 40 MG/1
40 TABLET ORAL
Status: DISCONTINUED | OUTPATIENT
Start: 2018-04-06 | End: 2018-04-07

## 2018-04-06 NOTE — SLP NOTE
ADULT VIDEOFLUOROSCOPIC SWALLOWING STUDY    Admission Date: 3/29/2018  Evaluation Date: 04/06/18  Radiologist: Dr. Jacobs Matter: Mechanical soft chopped  Diet Recommendations - Liquid:  Thin (No straw )    Dragan Bolls Impaired  Bolus Propulsion (VFSS - Thin Liquids):  Impaired  Triggered at: Valleculae  Premature Spillage to: Pyriform sinuses  Laryngeal Penetration: During the swallow (Flash)  Tracheal Aspiration: None  Cough/Throat Clear Response: No  Strategy(ies) Impl Penetration: None  Tracheal Aspiration: None  Cough/Throat Clear Response: No  Cough/Throat Clear Effective: No  Strategy(ies) Implemented (Hard Solid): Small bites and sips  Effectiveness: Yes  Penetration Aspiration Scale Score: Score 2: Material enters patient/family/caregiver. Agreement/Understanding verbalized and all questions answered to their apparent satisfaction. INTERDISCIPLINARY COMMUNICATION  Reviewed results with Radiologist; agreement verbalized.     Patient Experiencing Pain: No    FOLLOW

## 2018-04-06 NOTE — PROGRESS NOTES
Sydney Mota Patient Status:  Inpatient    3/25/1950 MRN [id number]   Location [facility] 2W/SW Attending SengBrett earl, [address] [facility] Day # 7 PCP Tomasa Mason MD     Critical Care Progress Note    Assessment/Plan:  1.  Hypoxemic respirato oz (57.4 kg)   SpO2 100%   BMI 21.73 kg/m²   Physical Exam:   General: alert, cooperative   HEENT: lips, mucosa, tongue normal. Mallampati II   Lungs: Diminished   Chest wall: chest tube in place   Heart: Regular rate and rhythm, normal S1S2, no murmur.

## 2018-04-06 NOTE — PHYSICAL THERAPY NOTE
PHYSICAL THERAPY TREATMENT NOTE - INPATIENT     Room Number: 326/932-Y       Presenting Problem: emergent CABG x 2    Problem List  Principal Problem:    Acute chest pain  Active Problems:    Acute kidney injury [facility])      ASSESSMENT   Patient received sit Device: Nasal cannula  Liters of O2:  5    AM-PAC '6-Clicks' INPATIENT SHORT FORM - BASIC MOBILITY  How much difficulty does the patient currently have. ..  -   Turning over in bed (including adjusting bedclothes, sheets and blankets)?: A Little   -   Sitti #5 Patient to demonstrate independence with home activity/exercise instructions provided to patient in preparation for discharge.    Goal #5   Current Status In progress   Goal #6     Goal #6  Current Status

## 2018-04-06 NOTE — PROGRESS NOTES
ASSESSMENT/PLAN:     IMP  1. s/p emergent cabg for abrupt left main closure; Off pressors and impella; responding to voice    2. Resp failure on vent with fio2 50%: off nitrous oxide; volume overload    3.  Low plt/ coagulopathy  Resolved;' appreciate he [place], [facility]   Family History   Problem Relation Age of Onset   • Breast Cancer Other      2 mat aunts-age?    • Heart Disorder Father    • Hypertension Father    • Heart Disorder Mother    • Hypertension Mother    • Heart Disorder Daughter    • Lung Disorde pulses normal. ABD AORTA:not enlarged. FEM:femoral pulses intact. PEDAL:pedal pulses intact. EXT:no peripheral edema.  Warm  Wounds ok    LABORATORY DATA:   Tele nsr  Labs reviewed  Gisela Mccarty MD

## 2018-04-06 NOTE — PLAN OF CARE
- Up to chair x 2  - pureed/nectar thick; very poor appetite. Needs video swallow once off vapotherm  - extreme anxiety/ fear of death; spiritual consult requested. Psych?  - tachy; notified Dr. Alexandra Ricci; increased frequency of Lopressor with parameters.  Gabby Incision(s), wounds(s) or drain site(s) healing without S/S of infection Progressing    • Oral mucous membranes remain intact Progressing

## 2018-04-06 NOTE — CM/SW NOTE
2:19pm [employer] authorization has been obtained by [facility] if pt is stable to dc this weekend. --  11:22am-Therapy updates sent via allscripts to [facility].  They will need to pursue insurance authorization prior to dc.    JOHANNY garcia

## 2018-04-06 NOTE — PROGRESS NOTES
[facility] Hospitalist Progress Note     CC: Hospital Follow up    PCP: Lethaniel Habermann, MD       Assessment/Plan:     Principal Problem:    Acute chest pain  Active Problems:    Acute kidney injury [facility])    Ms. Patricia Richardson is a 78 yo F with PM H of HTN, HL, admit.   Cont to monitor closely while on iv diuresis    Fever  - UA and ucx, BCX NGTD  - continue IV cefazolin per CV surgery    Hx breast cancer  - s/p R lumpectomy and radiation  - States she cannot have blood draw on R arm    Chronic R shoulder pain  - alert, tachypneic  HEENT: EOMI, PERRLA  Neck: Supple, no JVD  Pulm: crackles bilateral lungs, tachypneic, no increased work of breathing  CV: tachycardic, regular rhythm, no murmurs   ABD: Soft, non-tender, non-distended, +BS  MSK: moves all extremities  N disease may be related to the pulmonary congestion. Followup studies are advised. The examination was read on call by [facility] services with a similar interpretation given.     Dictated by (CST): Danilo Hoyt MD on 4/04/2018 at 5:53     Approve ondansetron HCl, potassium chloride **OR** potassium chloride, Magnesium Sulfate in D5W, Magnesium Sulfate, acetaminophen **OR** HYDROcodone-acetaminophen **OR** HYDROcodone-acetaminophen, morphINE sulfate **OR** morphINE sulfate **OR** morphINE sulfate, [

## 2018-04-06 NOTE — OCCUPATIONAL THERAPY NOTE
OCCUPATIONAL THERAPY TREATMENT NOTE - INPATIENT        Room Number: 476/606-W           Presenting Problem: cabg    Problem List  Principal Problem:    Acute chest pain  Active Problems:    Acute kidney injury [facility])      ASSESSMENT   RN cleared patient for Inpatient Daily Activity Short Form  How much help from another person does the patient currently need…  -   Putting on and taking off regular lower body clothing?: A Lot  -   Bathing (including washing, rinsing, drying)?: A Lot  -   Toileting, which inclu

## 2018-04-06 NOTE — PLAN OF CARE
Problem: Patient/Family Goals  Goal: Patient/Family Long Term Goal  Patient's Long Term Goal: To feel better  Interventions:  - Heart healthy diet  - Medication compliance  - Testing/Angiogram  - See additional Care Plan goals for specific interventions ordered  - Monitor response to interventions for patient's volume status, including labs, urine output, blood pressure (other measures as available)  - Encourage oral intake as appropriate  - Instruct patient on fluid and nutrition restrictions as appropri fever/infection during anticipated neutropenic period  INTERVENTIONS  - Monitor WBC  - Administer growth factors as ordered  - Implement neutropenic guidelines   Outcome: Progressing      Problem: DISCHARGE PLANNING  Goal: Discharge to home or other facili PO as ordered and tolerated  - Nasogastric tube to low intermittent suction as ordered  - Evaluate effectiveness of ordered antiemetic medications  - Provide nonpharmacologic comfort measures as appropriate  - Advance diet as tolerated, if ordered  - Obtai bundle as indicated   Outcome: Progressing    Goal: Oral mucous membranes remain intact  INTERVENTIONS  - Assess oral mucosa and hygiene practices  - Implement preventative oral hygiene regimen  - Implement oral medicated treatments as ordered   Outcome: P

## 2018-04-06 NOTE — PROGRESS NOTES
[place] [facility]    Progress Note    Devika Kelley Patient Status:  Inpatient    3/25/1950 MRN [id number]   Location [facility] 2W/SW Attending Katharine Last, [address] [facility] Day # 7 PCP Sharon Faust MD     Subjective:  Pt.  Sitting Extremities: Warm, dry, trace left LE edema/ no Right LE edema  Pulses: 2+ bilat DP  Skin: sternotomy incision drsg: CDI - TPW intact. Left upper thigh incision drsgs=CDI w/soft bruising noted to inner thigh.  Right femoral Impella site w/Provena drsg= CD

## 2018-04-06 NOTE — PLAN OF CARE
ANXIETY    • Will report anxiety at manageable levels Not Progressing        COPING    • Pt/Family able to verbalize concerns and demonstrate effective coping strategies Not Progressing          CARDIOVASCULAR - ADULT    • Maintains optimal cardiac output

## 2018-04-07 ENCOUNTER — APPOINTMENT (OUTPATIENT)
Dept: ULTRASOUND IMAGING | Facility: HOSPITAL | Age: 68
DRG: 215 | End: 2018-04-07
Attending: CLINICAL NURSE SPECIALIST
Payer: MEDICARE

## 2018-04-07 PROCEDURE — 93923 UPR/LXTR ART STDY 3+ LVLS: CPT | Performed by: CLINICAL NURSE SPECIALIST

## 2018-04-07 RX ORDER — HEPARIN SODIUM AND DEXTROSE 10000; 5 [USP'U]/100ML; G/100ML
1300 INJECTION INTRAVENOUS CONTINUOUS
Status: DISCONTINUED | OUTPATIENT
Start: 2018-04-07 | End: 2018-04-10

## 2018-04-07 RX ORDER — HEPARIN SODIUM AND DEXTROSE 10000; 5 [USP'U]/100ML; G/100ML
18 INJECTION INTRAVENOUS ONCE
Status: COMPLETED | OUTPATIENT
Start: 2018-04-07 | End: 2018-04-07

## 2018-04-07 RX ORDER — 0.9 % SODIUM CHLORIDE 0.9 %
VIAL (ML) INJECTION
Status: COMPLETED
Start: 2018-04-07 | End: 2018-04-07

## 2018-04-07 RX ORDER — HEPARIN SODIUM 5000 [USP'U]/ML
5000 INJECTION, SOLUTION INTRAVENOUS; SUBCUTANEOUS EVERY 12 HOURS
Status: DISCONTINUED | OUTPATIENT
Start: 2018-04-07 | End: 2018-04-07

## 2018-04-07 RX ORDER — POLYETHYLENE GLYCOL 3350 17 G/17G
17 POWDER, FOR SOLUTION ORAL DAILY
Status: DISCONTINUED | OUTPATIENT
Start: 2018-04-07 | End: 2018-04-13

## 2018-04-07 NOTE — PROGRESS NOTES
Pulmonary Progress Note      NAME: Amaury Cotsa - ROOM: [address] [place] [address] - MRN: [id number] - Age: 77year old - : 3/25/1950    Assessment/Plan:  1.  Hypoxemic respiratory failure - Extubated 4/3.  - refusing BiPAP  - needed vapotherm  - wean FiO2 as able  - a deformity. Heart: Regular rate and rhythm, normal S1S2, + murmur. Abdomen: soft, non-tender, non-distended, positive BS.    Extremity: no edema   Skin: no new rash   Neuro: alert an oriented this am    Recent Labs   Lab  04/06/18   0451   RBC  3.59*   H

## 2018-04-07 NOTE — PROGRESS NOTES
Patient complains of pain in the right hand. It comes and goes. There is associated pallor and poor waveform on the digital plethysmography for pulse oximeter. It appears somewhat worse today than in the past few days.   She had a radial line on that alee

## 2018-04-07 NOTE — PROGRESS NOTES
[facility]    Progress Note    Leigha Clamp Patient Status:  Inpatient    3/25/1950 MRN [id number]   Location [facility] 2W/SW Attending Daly Juares MD   Hosp Day # 8 PCP Jody Street MD     Subjective:  Pt. Sit Pulses: 2+ bilat DP  Skin: sternal incision drsg: CDI. Left upper thigh incision drsgs: CDI with soft bruising noted to inner thigh.  Right femoral Impella site Provena drsg: CDI   Neurological:  AAOx3, MAEW-weak    Assessment/Plan:  S/P EMERGENT CABG X 2

## 2018-04-07 NOTE — PROGRESS NOTES
[facility] Hospitalist Progress Note     CC: Hospital Follow up    PCP: Maritza Seo MD       Assessment/Plan:     Principal Problem:    Acute chest pain  Active Problems:    Acute kidney injury [facility])    Ms. Sarina Mcardle is a 76 yo F with PM H of HTN, HL, consulted hematology, appreciate recs    Acute blood loss anemia  - 2/2 blood loss in OR  - s/p 7 units pRBC intraop, 2u 4/3  - Hg now stable    MICHELLE-improved  - Cr initially 1.6 on admit, improved with IVF to 1.38, ACEI was held on admit.   Cont to monitor 3.2 oz (51.3 kg)  03/29/18 1536 : 103 lb (46.7 kg)  03/06/18 0805 : 102 lb 8 oz (46.5 kg)  02/13/18 1113 : 103 lb (46.7 kg)    Exam  GEN: female awake and alert,    HEENT: EOMI, PERRLA  Neck: Supple, no JVD  Pulm: crackles bilateral lungs, no increased wor famoTIDine  20 mg Oral Daily    Or   • famoTIDine  20 mg Intravenous Daily   • Metoclopramide HCl  5 mg Intravenous Q6H   • multivitamin  1 tablet Oral Daily   • Vitamin C  500 mg Oral TID   • Senna  8.6 mg Oral BID   • Chlorhexidine Gluconate  15 mL Mouth

## 2018-04-07 NOTE — PHYSICAL THERAPY NOTE
Attempted to see pt this AM. Patient just returned from ambulating from bathroom and sitting in chair. Family and RN present. Pt stating she is feeling anxious and tired from having a test (ultrasound) earlier and from being out of bed.  Pt declined partici

## 2018-04-07 NOTE — PROGRESS NOTES
ASSESSMENT/PLAN:     IMP  1. s/p emergent cabg for abrupt left main closure; Off pressors and impella; responding to voice    2. Resp failure on vent with fio2 50%: off nitrous oxide; volume overload resolving    3.  Low plt/ coagulopathy  Res[facility]   Family History   Problem Relation Age of Onset   • Breast Cancer Other      2 mat aunts-age?    • Heart Disorder Father    • Hypertension Father    • Heart Disorder Mother    • Hypertension Mother    • Heart Disorder Daughter normal. ABD AORTA:not enlarged. FEM:femoral pulses intact. PEDAL:pedal pulses intact. EXT:no peripheral edema.  Warm  Wounds ok    LABORATORY DATA:   Tele nsr  Labs reviewed  Lino Carter MD

## 2018-04-08 ENCOUNTER — APPOINTMENT (OUTPATIENT)
Dept: GENERAL RADIOLOGY | Facility: HOSPITAL | Age: 68
DRG: 215 | End: 2018-04-08
Attending: CLINICAL NURSE SPECIALIST
Payer: MEDICARE

## 2018-04-08 PROCEDURE — 71046 X-RAY EXAM CHEST 2 VIEWS: CPT | Performed by: CLINICAL NURSE SPECIALIST

## 2018-04-08 RX ORDER — 0.9 % SODIUM CHLORIDE 0.9 %
VIAL (ML) INJECTION
Status: DISPENSED
Start: 2018-04-08 | End: 2018-04-09

## 2018-04-08 RX ORDER — 0.9 % SODIUM CHLORIDE 0.9 %
VIAL (ML) INJECTION
Status: DISPENSED
Start: 2018-04-08 | End: 2018-04-08

## 2018-04-08 RX ORDER — HYDRALAZINE HYDROCHLORIDE 20 MG/ML
5 INJECTION INTRAMUSCULAR; INTRAVENOUS EVERY 4 HOURS PRN
Status: DISCONTINUED | OUTPATIENT
Start: 2018-04-08 | End: 2018-04-13

## 2018-04-08 RX ORDER — AMIODARONE HYDROCHLORIDE 200 MG/1
200 TABLET ORAL
Status: DISCONTINUED | OUTPATIENT
Start: 2018-04-08 | End: 2018-04-10

## 2018-04-08 RX ORDER — FUROSEMIDE 10 MG/ML
20 INJECTION INTRAMUSCULAR; INTRAVENOUS DAILY
Status: DISCONTINUED | OUTPATIENT
Start: 2018-04-08 | End: 2018-04-09

## 2018-04-08 RX ORDER — ALPRAZOLAM 0.5 MG/1
0.5 TABLET ORAL 3 TIMES DAILY PRN
Status: DISCONTINUED | OUTPATIENT
Start: 2018-04-08 | End: 2018-04-08

## 2018-04-08 RX ORDER — HYDROXYZINE HYDROCHLORIDE 25 MG/1
25 TABLET, FILM COATED ORAL 3 TIMES DAILY PRN
Status: DISCONTINUED | OUTPATIENT
Start: 2018-04-08 | End: 2018-04-13

## 2018-04-08 NOTE — PLAN OF CARE
ANXIETY    • Will report anxiety at manageable levels Progressing        CARDIOVASCULAR - ADULT    • Maintains optimal cardiac output and hemodynamic stability Progressing    • Absence of cardiac arrhythmias or at baseline Progressing        COPING    • Pt with 2 assist, with walker, a little unsteady on feet, generally weak overall , heparin drip maintained at 1000units /hr, sr up x4 with call light in reach, bed alarm on, bath given, pt slept for short periods off and on all nite

## 2018-04-08 NOTE — PROGRESS NOTES
[place] [facility]    Progress Note    Carola Nick Patient Status:  Inpatient    3/25/1950 MRN [id number]   Location [facility] 2W/SW Attending Fouzia Valenzuela MD   Hosp Day # 9 PCP Sandra Craig MD     Subjective:  Pt. Sit sternotomy incision drsg removed- incision SANDY=CDI. Removed left upper thigh incision drsg- SANDY=CDI.  Right femoral drsg with Provena= CDI   Neurological:  AAOx3, MAEW-weak     Assessment/Plan:  S/P EMERGENT CABG X 2 POD # 9  Encourage pulm toilet: IS & Dawson

## 2018-04-08 NOTE — CONSULTS
[facility], [address] [facility]  [address]     VASCULAR SURGERY CONSULT NOTE      Name: Kyung Ruelas   :   3/25/1950  [id number]     REFERRING PHYSICIAN: Kera Vogt Comment: Procedure: RIGHT PHACOEMULSIFICATION OF                CATARACT WITH INTRAOCULAR LENS IMPLANT [id number];                 Surgeon:  Judi Manuel MD;  Location: [address] [place] [address]    MEDICATIONS:     Current Facility-Administe •  Magnesium Sulfate IVPB premix SOLN 2 g, 2 g, Intravenous, PRN  •  multivitamin (ADULT) tab 1 tablet, 1 tablet, Oral, Daily  •  Vitamin C (VITAMIN C) tab 500 mg, 500 mg, Oral, TID  •  acetaminophen (TYLENOL) tab 650 mg, 650 mg, Oral, Q4H PRN **OR** HYDRO Comprehensive ROS reviewed and negative except for what's stated above. Including negative for chest pain, shortness of breath, syncope.      EXAM:  BP (!) 163/108 (BP Location: Left arm)   Pulse 75   Temp 99 °F (37.2 °C) (Temporal)   Resp (!) 32   Ht 5' 4 MG  1.9   --    --   2.2       No results for input(s): ALT, AST, ALB, AMYLASE, LIPASE, LDH in the last 72 hours. Invalid input(s): ALPHOS, TBIL, DBIL, TPROT    No results for input(s): TROP in the last 72 hours.   No results found for: ANAS, JOSE, ANASCR PROCEDURE: XR VIDEO SWALLOW (CPT=74230)  COMPARISON: None. INDICATIONS: Dysphagia, evaluate aspiration. TECHNIQUE: A swallowing evaluation was performed in the Radiology Department in conjunction with the Department of Speech and Hearing.   A separate det CONCLUSION:  1. Worsening bilateral airspace disease may be related to worsening pulmonary edema, although I cannot exclude worsening inflammatory process. Worsening left base consolidation and left pleural effusion. Followup studies are advised.      Carlos Eduardo Islas CONCLUSION:  1. Normal heart size and moderate pulmonary congestive changes slightly worse from previous study. Status post extubation and NG tube removal. No pneumothorax. Chest tubes remain.  [place]-E[place]as catheter has been replaced by a right internal jugula CONCLUSION:   Left-sided PICC line tip projects over the mid-distal SVC. The [place]-Elias catheter is now coiled in the right atrium. The tip may be within the right ventricle. Other findings are not significantly changed.   Dictated by (CST): Yaya Carter PROCEDURE: XR CHEST AP PORTABLE (CPT=71010) TIME: 1240 hours  COMPARISON: [facility], XR CHEST AP PORTABLE (CPT=71045), 3/30/2018, 22:54. [facility], XR CHEST AP PORTABLE (CPT=71045), 4/01/2018, 6:03.   INDICATIONS: Respirat PROCEDURE: XR CHEST AP PORTABLE (CPT=71010) TIME: 06:03. COMPARISON: [facility], XR CHEST PA + LAT CHEST (CPT=71020), 1/04/2018, 17:06. [facility], XR CHEST PA + LAT CHEST (HDW=10498), 3/29/2018, 16:38.   [facility] CONCLUSION:  1. Small to moderate bilateral pleural effusions with left greater than right basilar airspace disease that may reflect compressive atelectasis or superimposed infection.  2. Chronic reticulonodular opacities at the periphery of the right upper PROCEDURE: XR CHEST AP PORTABLE (CPT=71010) TIME: 22:54. COMPARISON: [facility], XR CHEST PA + LAT CHEST (CIH=20426), 3/29/2018, 16:38. INDICATIONS: Post-op CABG x2. TECHNIQUE:   Single view.    FINDINGS:  CARDIAC/VASC: Normal heart size The patient is a 77year old female who has multiple medical problems and is recovering from a a recent emergent CABG.  Without any imaging that is available, her clinical exam is suggestive of distal radial artery occlusion probably related to her rajinder an

## 2018-04-08 NOTE — PLAN OF CARE
GENITOURINARY - ADULT    • Absence of urinary retention Not Progressing     UTI   RESPIRATORY - ADULT    • Achieves optimal ventilation and oxygenation Not Progressing        RISK FOR INFECTION - ADULT    • Absence of fever/infection during anticipated tai applied. Left leg with extensive bruising noted. Ativan discontinued and xanax initiated per pulmonary. Encouraged up in chair for most of morning, resting in bed, sleeping well. Plan for jeanette when ready for discharge.

## 2018-04-08 NOTE — OCCUPATIONAL THERAPY NOTE
OCCUPATIONAL THERAPY TREATMENT NOTE - INPATIENT        Room Number: 792/606-W           Presenting Problem: cabg    Problem List  Principal Problem:    Acute chest pain  Active Problems:    Acute kidney injury [facility])      ASSESSMENT   Notified RN prior to Tyesha Escobar can't\"    OBJECTIVE  Precautions: Sternal    WEIGHT BEARING RESTRICTION  Weight Bearing Restriction: None                PAIN ASSESSMENT  Ratin  Location:  (did not rate pain)        ACTIVITY TOLERANCE  O2 Saturation: 98%  Room air  O2 Device: Nasal c 3 minutes w/ supervision in prep for adls   Comment: tolerates <1 min    Patient will incorporate sternal precautions into functional tasks without cues  Comment:  Is able to recall 3/6, unable to follow          Goals  on: 18  Frequency: 3x/wee

## 2018-04-08 NOTE — PROGRESS NOTES
Patient is somewhat weaker today and was on the sleeping laying in bed. She reports no change in symptoms in her right hand with no numbness or pain. She is on the heparin drip.   The Doppler exam still similar with signals all the way to the base of the

## 2018-04-08 NOTE — PHYSICAL THERAPY NOTE
PHYSICAL THERAPY TREATMENT NOTE - INPATIENT     Room Number: 257/798-Z       Presenting Problem: emergent CABG x 2    Problem List  Principal Problem:    Acute chest pain  Active Problems:    Acute kidney injury [facility])      ASSESSMENT   Chart reviewed for new depend and overall hygiene tasks. After tolieting required much encouragement for ambulation. Pt agreed to ambulate with RW from commode chair to chair in room approx  15 ft min assist of 2 used.      Pt encouraged to sit up in chair, pt wanting Fair -           Static Standing: Poor +  Dynamic Standing: Poor    ACTIVITY TOLERANCE-POOR   Resting /96 (116)  HR 71  O2 sats 98 % at rest   RR 32   After activity BP  156/87  ( 107 )  HR  73   O2 sats  98 %   RR  29---40  All activity on O2     AM #2  Current Status Min A x 1-2 with rolling walker    Goal #3 Patient is able to ambulate 100 feet with assist device: walker - rolling at assistance level: supervision   Goal #3   Current Status  min assist of 2  15 ft x 1 RW   Pt very lethargic and sleep

## 2018-04-08 NOTE — PROGRESS NOTES
Pulmonary Progress Note      NAME: Ricardo Bowling - ROOM: 808/312-W - MRN: [id number] - Age: 77year old - : 3/25/1950    Assessment/Plan:  1.  Hypoxemic respiratory failure - Extubated 4/3.  - weaning O2 - down to 7LPM this am  - wean FiO2 as able  - ag murmur. Abdomen: soft, non-tender, non-distended, positive BS. Extremity: no edema   Skin: no new rash   Neuro: alert    Recent Labs   Lab  04/08/18   0600   RBC  4.04   HGB  12.0   HCT  36.2   MCV  89.6   MCH  29.7   MCHC  33.2   RDW  15.7*   WBC  18.

## 2018-04-08 NOTE — PROGRESS NOTES
[facility]  Progress Note    Cayetano Cash Patient Status:  Inpatient    3/25/1950 MRN [id number]   Location [facility] 2W/SW Attending Raymon Brown MD   [address] Day # 9 PCP Daisy Castaneda MD     Impression:  Radha Pack Q24H   PEG 3350 (MIRALAX) powder packet 17 g 17 g Oral Daily   heparin (PORCINE) drip 13366vimqo/250mL infusion CONTINUOUS 200-3,000 Units/hr Intravenous Continuous   nystatin (MYCOSTATIN) suspension 500,000 Units 5 mL Oral QID   Clopidogrel Bisulfate (ADWOA (PF) 4 MG/ML injection 8 mg 8 mg Intravenous Q2H PRN   Senna (SENOKOT) tab TABS 8.6 mg 8.6 mg Oral BID   acetaminophen (TYLENOL) tab 650 mg 650 mg Oral Q6H PRN   Or      acetaminophen (TYLENOL) 160 MG/5ML oral liquid 650 mg 650 mg Oral Q6H PRN   Or      ac available for comparison.

## 2018-04-08 NOTE — PROGRESS NOTES
[facility] Hospitalist Progress Note     CC: Hospital Follow up    PCP: Raudel Kelley MD       Assessment/Plan:     Principal Problem:    Acute chest pain  Active Problems:    Acute kidney injury [facility])    Ms. Randal Huff is a 78 yo F with PM H of HTN, HL, surgery    Thrombocytopenia-improved, now stable  -  pre-op  - down to 80 on 3/31, likely due to consumption from CV surgery and bleeding  - s/p multiple units PLT transfusion  - HIPA neg * 2    Coagulopathy - improved   - per heme likely shock, marques (Last 24 hours) at 04/08/18 0915  Last data filed at 04/08/18 0600   Gross per 24 hour   Intake              660 ml   Output              675 ml   Net              -15 ml       Last 3 Weights  04/07/18 0600 : 122 lb 4.8 oz (55.5 kg)  04/06/18 0500 : 126 lb combination of interstitial and alveolar edema and/or superimposed pneumonia. This is slightly progressed from previous. 2. Diffuse pulmonary vascular congestion with pleural effusions. 3. Postthoracotomy chest with borderline cardiac size.     Dictated b

## 2018-04-08 NOTE — PLAN OF CARE
CARDIOVASCULAR - ADULT    • Absence of cardiac arrhythmias or at baseline Not Progressing         AFIB 124. Amiodarone protocol initiated after lab draws. Converted back to SR. Amiiodarone drip and PO medication given.

## 2018-04-09 RX ORDER — PHENAZOPYRIDINE HYDROCHLORIDE 100 MG/1
100 TABLET, FILM COATED ORAL
Status: COMPLETED | OUTPATIENT
Start: 2018-04-09 | End: 2018-04-11

## 2018-04-09 RX ORDER — WARFARIN SODIUM 2 MG/1
2 TABLET ORAL ONCE
Status: COMPLETED | OUTPATIENT
Start: 2018-04-09 | End: 2018-04-09

## 2018-04-09 RX ORDER — POTASSIUM CHLORIDE 29.8 MG/ML
INJECTION INTRAVENOUS
Status: DISPENSED
Start: 2018-04-09 | End: 2018-04-09

## 2018-04-09 RX ORDER — ENALAPRIL MALEATE 5 MG/1
10 TABLET ORAL 2 TIMES DAILY
Status: DISCONTINUED | OUTPATIENT
Start: 2018-04-09 | End: 2018-04-11

## 2018-04-09 NOTE — PROGRESS NOTES
Pulmonary Progress Note     Assessment / Plan:  1. Hypoxemic respiratory failure - extubated 4/3  - weaning O2 - down to 2LPM this am  - diuresis per cards  - IS or ezpap  - psych to see for anxiety  2.  Leukocytosis - no fever  - started on ceftriaxone for

## 2018-04-09 NOTE — PROGRESS NOTES
[facility] Hospitalist Progress Note     CC: Hospital Follow up    PCP: Joselin Gonzalez MD       Assessment/Plan:     Principal Problem:    Acute chest pain  Active Problems:    Acute kidney injury [facility])    Ms. Aron Bell is a 76 yo F with PM H of HTN, HL, surgery    Thrombocytopenia-improved, now stable  -  pre-op  - down to 80 on 3/31, likely due to consumption from CV surgery and bleeding  - s/p multiple units PLT transfusion  - HIPA neg * 2  - plts now normal    Coagulopathy - improved   - per hem kg)  04/06/18 0500 : 126 lb 9.6 oz (57.4 kg)  04/05/18 0600 : 132 lb 12.8 oz (60.2 kg)  04/04/18 0600 : 143 lb 6.4 oz (65 kg)  04/03/18 0500 : 141 lb 14.4 oz (64.4 kg)  04/02/18 0600 : 139 lb 9.6 oz (63.3 kg)  04/01/18 0500 : 135 lb 8 oz (61.5 kg)  03/31/1 Approved by (CST): Cleo Guaman MD on 4/08/2018 at 9:10          Xr Video Swallow (cpt=74230)    Result Date: 4/6/2018  CONCLUSION:  1. No evidence of laryngeal penetration or aspiration.    A full report will follow by the speech pathologist.     Luis Eduardo Andrews

## 2018-04-09 NOTE — OCCUPATIONAL THERAPY NOTE
OCCUPATIONAL THERAPY TREATMENT NOTE - INPATIENT        Room Number: 568/832-N           Presenting Problem: cabg    Problem List  Principal Problem:    Acute chest pain  Active Problems:    Acute kidney injury [facility])      ASSESSMENT    Coordinated care with usually take pills when this happens\"    OBJECTIVE  Precautions: Sternal;Cardiac;Bed/chair alarm;Limb alert - right;Limb alert - left    WEIGHT BEARING RESTRICTION  Weight Bearing Restriction: None                PAIN ASSESSMENT  Ratin  Location:  (di Goals:        Patient will complete functional transfer with supervision  Comment: progressing min A x1    Patient will complete le dressing with supervision  Comment: NT    Patient will tolerate standing for 3 minutes w/ supervision in prep for adls   Com

## 2018-04-09 NOTE — SLP NOTE
SPEECH DAILY NOTE - INPATIENT    ASSESSMENT & PLAN   ASSESSMENT  Patient seen to monitor tolerance of PO diet. Patient reported she was not feeling well therefore only took 2 small sips of thin liquid.   Swallows appeared timely and with adequate laryngeal sips, Multiple swallows, Alternate liquids/solids, No straws, Upright 90 degrees, Eliminate distractions with min assistance 100 % of the time across 2 sessions. Patient took small sips via cup.   No straw was in room and \"no straw\" was posted on white

## 2018-04-09 NOTE — PLAN OF CARE
GASTROINTESTINAL - ADULT    • Maintains or returns to baseline bowel function Not Progressing        Pt constipated, appetite remains poor  CARDIOVASCULAR - ADULT    • Maintains optimal cardiac output and hemodynamic stability Progressing    • Absence of c

## 2018-04-09 NOTE — PROGRESS NOTES
[facility] [address] [place] [address] Cardiology  Progress Note    Ruth Medici Patient Status:  Inpatient    3/25/1950 MRN [id number]   Location [facility] 2W/SW Attending Deette Sacks, MD   Hosp Day # 10 PCP Lethaniel Habermann, MD     Impression:  Elmer Sevilla MBP/ADD-vantage 1 g Intravenous Q24H   HydrOXYzine HCl (ATARAX) tab 25 mg 25 mg Oral TID PRN   furosemide (LASIX) injection 20 mg 20 mg Intravenous Daily   hydrALAzine HCl (APRESOLINE) injection 5 mg 5 mg Intravenous Q4H PRN   amiodarone HCl in Dextrose (C Oral Q4H PRN   Or      HYDROcodone-acetaminophen (NORCO) 5-325 MG per tab 1 tablet 1 tablet Oral Q4H PRN   Or      HYDROcodone-acetaminophen (NORCO) 5-325 MG per tab 2 tablet 2 tablet Oral Q4H PRN   morphINE sulfate (PF) 2 MG/ML injection 2 mg 2 mg Mandi Malave pleural effusions. 3. Postthoracotomy chest with borderline cardiac size. ECHO 3/30/18:  Study Conclusions  1. Left ventricle: Limited study     Impella device noted in left ventricle. Systolic function was     severely reduced.  The estimated

## 2018-04-09 NOTE — PROGRESS NOTES
[facility]    Progress Note    Leigha Pereira Patient Status:  Inpatient    3/25/1950 MRN [id number]   Location [facility] 2W/SW Attending Everardo Thakkar, [address] [facility] [address] Day # 10 PCP [address] MD     Subjective:  Pt.  Sitting in NAD  Neck: No JVD  Lungs: Right crackles approx 1/3 way up and diminished/left diminished througout  Heart: RRR, S1, S2  Abdomen: Soft, NT/ND, BS+x4, +flatus, +BM (small)  Extremities: Warm, dry, no LE edema bilat  Pulses: 2+ bilat DP  Skin: sternotomy inc

## 2018-04-09 NOTE — PHYSICAL THERAPY NOTE
PHYSICAL THERAPY TREATMENT NOTE - INPATIENT     Room Number: 495/319-O       Presenting Problem: emergent CABG x 2    Problem List  Principal Problem:    Acute chest pain  Active Problems:    Acute kidney injury [facility])      ASSESSMENT   Chart reviewed ROM , pt able to complete ankle pumps and LAQ . [place] Attempt with  incentive spirometer ---pt unable to perform incentive spirometer with max cues--- Respitory therapy arrives end of session and to work with pt on this.        Report to RN of pt status ---PT emanuel SHORT FORM - BASIC MOBILITY  How much difficulty does the patient currently have. ..  -   Turning over in bed (including adjusting bedclothes, sheets and blankets)?: A Lot   -   Sitting down on and standing up from a chair with arms (e.g., wheelchair, bedsi ft x 1   Improved tolerance today slow gait speed decrease posture    Goal #4     Goal #4   Current Status     Goal #5 Patient to demonstrate independence with home activity/exercise instructions provided to patient in preparation for discharge.    Goal #5

## 2018-04-09 NOTE — CARDIAC REHAB
Cardiac Rehab Phase I    Activity:   Chair: Yes   Ambulation: Yes   Assistive Device: Walker   Distance: 40 feet   Assistance needed: Moderate   Patient tolerated activity: Well. Shower Date: 4/9/18 Tolerated Shower Activity Well.     Education:  Handouts

## 2018-04-09 NOTE — PLAN OF CARE
Will report anxiety at manageable levels Progressing    Becoming much calmer. Family requests pt not be given any meds that will affect her loc such as antianxiety agents. None given. Relaxation techniques provided.   Absence of cardiac arrhythmias or at ba

## 2018-04-09 NOTE — CM/SW NOTE
Clinical updates, including PT/OT from yesterday 4/8 sent to [address] [place] [address] to continue the insurance authorization.     JOHANNY garcia [id number]

## 2018-04-09 NOTE — PHYSICAL THERAPY NOTE
Attempt x 1   Per RN  Just went back to bed and pt need rest      Therapy will follow up later as appropriate

## 2018-04-10 RX ORDER — ATORVASTATIN CALCIUM 40 MG/1
40 TABLET, FILM COATED ORAL NIGHTLY
Status: DISCONTINUED | OUTPATIENT
Start: 2018-04-10 | End: 2018-04-12

## 2018-04-10 RX ORDER — WARFARIN SODIUM 2 MG/1
2 TABLET ORAL ONCE
Status: COMPLETED | OUTPATIENT
Start: 2018-04-10 | End: 2018-04-10

## 2018-04-10 RX ORDER — AMIODARONE HYDROCHLORIDE 200 MG/1
200 TABLET ORAL 2 TIMES DAILY WITH MEALS
Status: DISCONTINUED | OUTPATIENT
Start: 2018-04-10 | End: 2018-04-13

## 2018-04-10 RX ORDER — POTASSIUM CHLORIDE 29.8 MG/ML
40 INJECTION INTRAVENOUS ONCE
Status: COMPLETED | OUTPATIENT
Start: 2018-04-10 | End: 2018-04-10

## 2018-04-10 NOTE — OCCUPATIONAL THERAPY NOTE
OCCUPATIONAL THERAPY TREATMENT NOTE - INPATIENT        Room Number: 346/025-Y           Presenting Problem: cabg    Problem List  Principal Problem:    Acute chest pain  Active Problems:    Acute kidney injury [facility])      ASSESSMENT   Coordinated care with training; Compensatory technique education    SUBJECTIVE  \"I already did that, I brushed my teeth, washed my face, I walked this morning\"    OBJECTIVE  Precautions: Sternal;Cardiac;Bed/chair alarm    WEIGHT BEARING RESTRICTION  Weight Bearing Restriction: supervision  Comment: Setup; cross legs cueing to adhere to precatuions    Patient will tolerate standing for 3 minutes w/ supervision in prep for adls   Comment: tolerates ~3 minutes with CGA-Min A     Patient will incorporate sternal precautions into fun

## 2018-04-10 NOTE — PHYSICAL THERAPY NOTE
PHYSICAL THERAPY TREATMENT NOTE - INPATIENT     Room Number: 465/956-R       Presenting Problem: emergent CABG x 2    Problem List  Principal Problem:    Acute chest pain  Active Problems:    Acute kidney injury ([facility] [address]      ASSESSMENT   Pt was seen for skil good progress in PT towards the stated goals. Pt continues to stay below the baseline and will continue to benefit from skilled PT while in hospital so pt can maximize functional potential and achieve higher level of function.       DISCHARGE RECOMMENDATION CK    FUNCTIONAL ABILITY STATUS  Gait Assessment   Gait Assistance:  (CGAx1)  Distance (ft): 130ft  Assistive Device: Rolling walker  Pattern:  (kyphotic posture, fair JOSE, steady gait with RW)  Stoop/Curb Assistance: Not tested  Comment : pt requires max

## 2018-04-10 NOTE — PROGRESS NOTES
[facility] Hospitalist Progress Note     CC: Hospital Follow up    PCP: Lu Moritz, MD       Assessment/Plan:     Principal Problem:    Acute chest pain  Active Problems:    Acute kidney injury [facility])    Ms. Giovanna Krishnamurthy is a 76 yo F with PM H of HTN, HL, to consumption from CV surgery and bleeding  - s/p multiple units PLT transfusion  - HIPA neg * 2  - plts now normal    Coagulopathy - improved   - per heme likely shock, surgery, sepsis, and AC use  - INR 3.1 ? pre-op at 8PM  - s/p multiple units 8 units F kg)  04/07/18 0600 : 122 lb 4.8 oz (55.5 kg)  04/06/18 0500 : 126 lb 9.6 oz (57.4 kg)  04/05/18 0600 : 132 lb 12.8 oz (60.2 kg)  04/04/18 0600 : 143 lb 6.4 oz (65 kg)  04/03/18 0500 : 141 lb 14.4 oz (64.4 kg)  04/02/18 0600 : 139 lb 9.6 oz (63.3 kg)  04/01 Pratibha Lu MD on 4/08/2018 at 9:00     Approved by (CST): Pratibha Lu MD on 4/08/2018 at 9:10              Meds:     • potassium chloride  40 mEq Intravenous Once   • Amiodarone HCl  200 mg Oral BID with meals   • atorvastatin  40 mg Oral Nightly

## 2018-04-10 NOTE — PROGRESS NOTES
Emma Encinas Patient Status:  Inpatient    3/25/1950 MRN [id number]   Location [facility] 2W/SW Attending Laxmi Arellano MD   Hosp Day # 6 PCP Raudel Kelley MD     Critical Care Progress Note      Assessment/Plan:  1.  Hypoxemic respirator (55.7 kg)   SpO2 95%   BMI 21.08 kg/m²   Physical Exam:   General: alert, sitting up in a chair   HEENT: lips, mucosa, tongue normal. Mallampati II   Lungs: Clear to auscultation bilaterally, no focal wheezes or crackles    Chest wall: Midline incisino not

## 2018-04-10 NOTE — PLAN OF CARE
Problem: CARDIOVASCULAR - ADULT  Goal: Absence of cardiac arrhythmias or at baseline  INTERVENTIONS:  - Continuous cardiac monitoring, monitor vital signs, obtain 12 lead EKG if indicated  - Evaluate effectiveness of antiarrhythmic and heart rate control m diet as tolerated, if ordered  - Obtain nutritional consult as needed  - Evaluate fluid balance   Outcome: Progressing  + BM, C/O NAUSEA AFTER EATING~ZOFRAN GIVEN    Problem: GENITOURINARY - ADULT  Goal: Absence of urinary retention  INTERVENTIONS:  - Asse

## 2018-04-10 NOTE — PROGRESS NOTES
[facility] Cardiology  Progress Note    Jillian Fierro Patient Status:  Inpatient    3/25/1950 MRN [id number]   Location [facility] 2W/SW Attending Steven Nuñez MD   Hosp Day # 6 PCP Fermin Abebe MD     Impression:  [facility] mEq Intravenous Once   amiodarone HCl (PACERONE) tab 200 mg 200 mg Oral BID with meals   atorvastatin (LIPITOR) tab 40 mg 40 mg Oral Nightly   Enalapril Maleate (VASOTEC) tab 10 mg 10 mg Oral BID   Phenazopyridine HCl (PYRIDIUM) tab 100 mg 100 mg Oral TID Or      morphINE sulfate (PF) 4 MG/ML injection 4 mg 4 mg Intravenous Q2H PRN   Or      morphINE sulfate (PF) 4 MG/ML injection 8 mg 8 mg Intravenous Q2H PRN   Senna (SENOKOT) tab TABS 8.6 mg 8.6 mg Oral BID   acetaminophen (TYLENOL) tab 650 mg 650 mg Or

## 2018-04-10 NOTE — PROGRESS NOTES
Doing well overall  Started on coumadin  R hand is unchanged, still a bit cooler than left  Has no sx of numbness and therefore will treat conservatively

## 2018-04-10 NOTE — BH PROGRESS NOTE
Behavioral Health Note  CHIEF COMPLAINT  Anxiety  REASON FOR ADMISSION  Emergent CABG  SOCIAL HISTORY  The patient lives alone in an apartment, works and is independent with her care. Patient quit smoking 20 years ago.    PAST PSYCHIATRIC HISTORY  Patient mood  Conscious/Orientation: alert and oriented, good attention and good memory  Thought process: linear  Thought content:  No abnormality  Perceptions: no hallucinations  Insight: good  Judgment: good  SUICIDAL RISK ASSESSMENT  No SI/HI  ASSESSMENT  The p

## 2018-04-10 NOTE — CM/SW NOTE
SW informed of possible dc to snf tomorrow 4/11. Update given to [facility] snf admissions.     JOHANNY garcia [id number]

## 2018-04-10 NOTE — PROGRESS NOTES
[place] [facility]    Progress Note    Williemae Plaster Patient Status:  Inpatient    3/25/1950 MRN [id number]   Location [facility] 2W/SW Attending Dilma Manning, [address] [facility] Se Day # 11 PCP Fermin Abebe MD     Subjective:  Pt. continues noted to inner thigh; small necrotic area noted to distal area of incisions with xeroform drsg intact. right femoral Impella site=CDI. Neurological:  AAOx3, MAEW    Assessment/Plan:  S/P EMERGENT CABG X 2 POD # 11  Encourage pulm toilet: IS & Acapella.  Trudy Phalen

## 2018-04-10 NOTE — SLP NOTE
SPEECH DAILY NOTE - INPATIENT    ASSESSMENT & PLAN   ASSESSMENT    Pt seen upright in chair with current diet of chopped (Pt's preference) and thin liquids for monitoring of diet tolerance.  Swallowing precautions/strategies discussed and demonstrated with adequate understanding.      GOAL MET     Goal #3 The patient will utilize compensatory strategies as outlined by  BSSE (clinical evaluation) including Slow rate, Small bites, Small sips, Multiple swallows, Alternate liquids/solids, No straws, Upright 90 de

## 2018-04-11 ENCOUNTER — APPOINTMENT (OUTPATIENT)
Dept: GENERAL RADIOLOGY | Facility: HOSPITAL | Age: 68
DRG: 215 | End: 2018-04-11
Attending: CLINICAL NURSE SPECIALIST
Payer: MEDICARE

## 2018-04-11 PROCEDURE — 71045 X-RAY EXAM CHEST 1 VIEW: CPT | Performed by: CLINICAL NURSE SPECIALIST

## 2018-04-11 PROCEDURE — 90792 PSYCH DIAG EVAL W/MED SRVCS: CPT | Performed by: OTHER

## 2018-04-11 RX ORDER — MIRTAZAPINE 15 MG/1
15 TABLET, FILM COATED ORAL NIGHTLY
Status: DISCONTINUED | OUTPATIENT
Start: 2018-04-11 | End: 2018-04-13

## 2018-04-11 NOTE — PLAN OF CARE
Problem: CARDIOVASCULAR - ADULT  Goal: Maintains optimal cardiac output and hemodynamic stability  INTERVENTIONS:  - Monitor vital signs, rhythm, and trends  - Monitor for bleeding, hypotension and signs of decreased cardiac output  - Evaluate effectivenes Teach and rehearse alternative coping skills  - Provide emotional support with 1:1 interaction with staff   Outcome: Not Progressing  Pt unable to sleep throughout the night. Up to the bathroom and to the chair every hour. Buspar given without relief.     P Administer growth factors as ordered  - Implement neutropenic guidelines   Outcome: Not Progressing  Cont to be afebrile.  WBC slightly up this am.     Problem: RESPIRATORY - ADULT  Goal: Achieves optimal ventilation and oxygenation  INTERVENTIONS:  - Asses retention  INTERVENTIONS:  - Assess patient’s ability to void and empty bladder  - Monitor intake/output and perform bladder scan as needed  - Follow urinary retention protocol/standard of care  - Consider collaborating with pharmacy to review patient's me

## 2018-04-11 NOTE — BH PROGRESS NOTE
[facility]  Vascular Surgery Progress Note    Ruth Dave Patient Status:  Inpatient    3/25/1950 MRN [id number]   Location [facility] 2W/SW Attending Ami Amis, [address] [facility] [address] Day # 11 PCP Lethaniel Habermann, MD       Assessment and Plan: function bilaterally. Both hands are cool.     Labs:    Lab Results  Component Value Date   WBC 26.2 04/10/2018   HGB 11.3 04/10/2018   HCT 35.0 04/10/2018    04/10/2018   CREATSERUM 1.47 04/10/2018   BUN 48 04/10/2018    04/10/2018   K 3.4 04 40 mEq 40 mEq Intravenous PRN   Magnesium Sulfate in D5W IVPB premix 1 g 1 g Intravenous PRN   Magnesium Sulfate IVPB premix SOLN 2 g 2 g Intravenous PRN   multivitamin (ADULT) tab 1 tablet 1 tablet Oral Daily   Vitamin C (VITAMIN C) tab 500 mg 500 mg Oral

## 2018-04-11 NOTE — OCCUPATIONAL THERAPY NOTE
OCCUPATIONAL THERAPY TREATMENT NOTE - INPATIENT        Room Number: 175/964-U           Presenting Problem: cabg    Problem List  Principal Problem:    Acute chest pain  Active Problems:    Acute kidney injury [facility])      ASSESSMENT   Coordinated care with 80    ACTIVITIES OF DAILY LIVING ASSESSMENT  AM-PAC ‘6-Clicks’ Inpatient Daily Activity Short Form  How much help from another person does the patient currently need…  -   Putting on and taking off regular lower body clothing?: A Little  -   Bathing (inclu tasks           Goals  on: 18  Frequency: 3x/week    Livia Da Silva, OTR/L   5 [facility]

## 2018-04-11 NOTE — PROGRESS NOTES
Pulmonary Progress Note     Assessment / Plan:  1. Hypoxemic respiratory failure - extubated 4/3. Resolved  - on RA now  - diuresis per cards  - IS or ezpap  - psych to see for anxiety  2.  Leukocytosis - no fever  - started on ceftriaxone for suspected UTI

## 2018-04-11 NOTE — CONSULTS
[facility] AND [facility] Infectious Disease  Report of Consultation    Julien Moreno Patient Status:  Inpatient    3/25/1950 MRN [id number]   Location [facility] 2W/SW Attending Jennifer Mendoza MD   Hosp Day # 12 PCP Lucas Castorena MD     Date INTRAOCULAR LENS IMPLANT [id number];                 Surgeon:  Mihai Casanova MD;  Location: [address] [place] [address]  2/10/2016: [address] [facility] CATARACT EXTRACAP,INSERT LENS Right      Comment: Procedure: RIGHT PHACOEMULSIFICATION OF                Tyrone Daly famoTIDine (PEPCID) injection 20 mg, 20 mg, Intravenous, Daily  •  BusPIRone HCl (BUSPAR) tab 5 mg, 5 mg, Oral, BID PRN  •  TraMADol HCl (ULTRAM) tab 50 mg, 50 mg, Oral, Q6H PRN  •  PEG 3350 (MIRALAX) powder packet 17 g, 17 g, Oral, Daily PRN  •  bisacodyl changes. HEENT:  No visual changes, oral ulcers, sore throat, difficulty swallowing. Respiratory: Negative for cough, sputum, hemoptysis, chest pain, wheezing, dyspnea on exertion, or stridor. Cardiovascular:  s/p CABG.    Gastrointestinal:  No abdomi wheezes. Chest wall: No tenderness or deformity. Heart: Regular rate and rhythm, normal S1S2, no murmurs. Abdomen: Soft, NT/ND. Bowel sounds present. No organomegaly. Extremity: No edema. Skin: No rashes or lesions.    Neurological: No focal tai wrist  - Vascular surgery consulted  - Was on heparin gtt    6. Disposition - inpatient. Improving in spite of rise in WBCs. No obvious focal infection. UA not terribly impressive by my view.   As she approaches discharge and concern persists for possib

## 2018-04-11 NOTE — PHYSICAL THERAPY NOTE
PHYSICAL THERAPY TREATMENT NOTE - INPATIENT     Room Number: 944/934-Z       Presenting Problem: emergent CABG x 2    Problem List  Principal Problem:    Acute chest pain  Active Problems:    Acute kidney injury ([address]      ASSESSMENT   Pt received supine i from a bed to a chair (including a wheelchair)?: A Little   -   Need to walk in hospital room?: A Little   -   Climbing 3-5 steps with a railing?: A Little     AM-PAC Score:  Raw Score: 18   PT Approx Degree of Impairment Score: 46.58%   Standardized Score

## 2018-04-11 NOTE — PROGRESS NOTES
[facility] Hospitalist Progress Note     CC: Hospital Follow up    PCP: Mariama Galloway MD       Assessment/Plan:     Principal Problem:    Acute chest pain  Active Problems:    Acute kidney injury [facility])    Ms. Susana Gillis is a 78 yo F with PM H of HTN, HL, multiple units PLT transfusion  - HIPA neg * 2  - plts now normal    Coagulopathy - improved   - per heme likely shock, surgery, sepsis, and AC use  - INR 3.1    - s/p multiple units 8 units FFP, 3 units cryo  - INR now stable, ozzing improved  - consulted kg)  04/10/18 0500 : 122 lb 12.8 oz (55.7 kg)  04/09/18 0600 : 119 lb 6.4 oz (54.2 kg)  04/07/18 0600 : 122 lb 4.8 oz (55.5 kg)  04/06/18 0500 : 126 lb 9.6 oz (57.4 kg)  04/05/18 0600 : 132 lb 12.8 oz (60.2 kg)  04/04/18 0600 : 143 lb 6.4 oz (65 kg)  04/03 most likely related to compressive atelectasis; less likely superimposed infection. 3. Post thoracotomy. 4. Lesser incidental findings as above.      Dictated by (CST): Kira Denton MD on 4/11/2018 at 7:31     Approved by (CST): Kira Denton MD on 4/1

## 2018-04-11 NOTE — PROGRESS NOTES
[facility]  Progress Note    Dav Pulido Patient Status:  Inpatient    3/25/1950 MRN [id number]   Location [facility] 2W/SW Attending Seven Lincoln MD   [facility] Day # 12 PCP Mariama Galloway MD     Impression:  [facility] (ROCEPHIN) 1 g in sodium chloride 0.9 % 100 mL MBP/ADD-vantage 1 g Intravenous Q24H   HydrOXYzine HCl (ATARAX) tab 25 mg 25 mg Oral TID PRN   hydrALAzine HCl (APRESOLINE) injection 5 mg 5 mg Intravenous Q4H PRN   PEG 3350 (MIRALAX) powder packet 17 g 17 g Q4H PRN   ondansetron HCl (ZOFRAN) injection 4 mg 4 mg Intravenous Q6H PRN     Facility-Administered Medications Ordered in Other Encounters:  propofol (DIPRIVAN) injection  Intravenous PRN   succinylcholine chloride (ANECTINE) injection  Intravenous PRN

## 2018-04-11 NOTE — PLAN OF CARE
Problem: Patient/Family Goals  Goal: Patient/Family Long Term Goal  Patient's Long Term Goal: To feel better  Interventions:  - Heart healthy diet  - Medication compliance  - Testing/Angiogram  - See additional Care Plan goals for specific interventions urine specific gravity, serum osmolarity and serum sodium as indicated or ordered  - Monitor response to interventions for patient's volume status, including labs, urine output, blood pressure (other measures as available)  - Encourage oral intake as appro unsuccessful or patient reports new pain  - Anticipate increased pain with activity and pre-medicate as appropriate   Outcome: Adequate for Discharge      Problem: RISK FOR INFECTION - ADULT  Goal: Absence of fever/infection during anticipated neutropenic handout, if applicable  - Encourage broncho-pulmonary hygiene including cough, deep breathe, Incentive Spirometry  - Assess the need for suctioning and perform as needed  - Assess and instruct to report SOB or any respiratory difficulty  - Respiratory Ther infection  INTERVENTIONS:  - Assess and document risk factors for pressure ulcer development  - Assess and document skin integrity  - Assess and document dressing/incision, wound bed, drain sites and surrounding tissue  - Implement wound care per orders  -

## 2018-04-11 NOTE — BH PROGRESS NOTE
Behavioral Health Note  CHIEF COMPLAINT  Anxiety  REASON FOR ADMISSION  Emergent CABG  SOCIAL HISTORY  The patient lives alone in an apartment, works and is independent with her care. Patient quit smoking 20 years ago.    PAST PSYCHIATRIC HISTORY  Patient

## 2018-04-11 NOTE — CARDIAC REHAB
Cardiac Rehab Phase I    Activity:   Chair: Yes   Ambulation: Yes   Assistive Device: Walker   Distance: 100 feet   Assistance needed: Moderate   Patient tolerated activity: Well. Shower Date:  Tolerated Shower Activity .     Education:  Handouts provided

## 2018-04-12 ENCOUNTER — APPOINTMENT (OUTPATIENT)
Dept: ULTRASOUND IMAGING | Facility: HOSPITAL | Age: 68
DRG: 215 | End: 2018-04-12
Attending: HOSPITALIST
Payer: MEDICARE

## 2018-04-12 ENCOUNTER — APPOINTMENT (OUTPATIENT)
Dept: NUCLEAR MEDICINE | Facility: HOSPITAL | Age: 68
DRG: 215 | End: 2018-04-12
Attending: INTERNAL MEDICINE
Payer: MEDICARE

## 2018-04-12 PROCEDURE — 76705 ECHO EXAM OF ABDOMEN: CPT | Performed by: HOSPITALIST

## 2018-04-12 RX ORDER — POTASSIUM CHLORIDE 29.8 MG/ML
40 INJECTION INTRAVENOUS ONCE
Status: COMPLETED | OUTPATIENT
Start: 2018-04-12 | End: 2018-04-12

## 2018-04-12 NOTE — PROGRESS NOTES
Pulmonary Progress Note      NAME: Idris Esteves - ROOM: 598/997-Z - MRN: [id number] - Age: 77year old - : 3/25/1950    Assessment/Plan:  1. Hypoxemic respiratory failure - extubated 4/3.  Resolved  - on RA now  - diuresis per cards  - IS or ezpap  - p mucosa, and tongue normal.  No thrush noted. Lungs: Clear to auscultation bilaterally, no focal wheezes or crackles    Chest wall: midline incision noted   Heart: Regular rate and rhythm, normal S1S2, no murmur.    Abdomen: soft, non-tender, non-distended

## 2018-04-12 NOTE — PROGRESS NOTES
[facility] Hospitalist Progress Note     CC: Hospital Follow up    PCP: Bertha Barlow MD       Assessment/Plan:     Principal Problem:    Acute chest pain  Active Problems:    Acute kidney injury ([facility] [address]    Generalized anxiety disorder    Major depressive di emergent CABG by Dr. Judge Crawford.  impella removed in OR 4/2  - ASA and plavix on hold per CV surgery given bleeding (on orders but not given)-start when ok w/ CVS  - monitor for expected blood loss- pre-op Hg 12.9; transfusions per surgery, hg stable   - s/p extub 17  BP: (111-165)/() 111/69      Intake/Output:    Intake/Output Summary (Last 24 hours) at 04/12/18 1241  Last data filed at 04/12/18 0600   Gross per 24 hour   Intake              750 ml   Output               75 ml   Net              675 ml Persistent but significantly improved right basilar airspace disease, most suggestive of resolving pneumonia or less likely resolving pulmonary edema. 2. Persistent small left and trace right pleural effusions.  Associated left basilar airspace disease, mos

## 2018-04-12 NOTE — PLAN OF CARE
ANXIETY    • Will report anxiety at manageable levels Progressing        CARDIOVASCULAR - ADULT    • Maintains optimal cardiac output and hemodynamic stability Progressing    • Absence of cardiac arrhythmias or at baseline Progressing        COPING    • Pt did go for a walk today; she did get winded but was able to finish. Plan is for gallium scan tomorrow and hopefully being discharged to [facility].

## 2018-04-12 NOTE — OCCUPATIONAL THERAPY NOTE
Attempted to see patient this AM; patient declining at this time; stated she is very upset about not being d/c to rehab; is very anxious; will follow up if schedule allows.     Krista Nissen Aper, OTR/L   5 [facility]

## 2018-04-12 NOTE — PROGRESS NOTES
Misc. Note    Kelley Nissen, NP  2018  [facility] HOSP - [facility]    Progress Note    Cayetano Cash Patient Status:  Inpatient    3/25/1950 MRN [id number]   Location [facility] 2W/SW Attending Audrey Chen MD   [address] [facility] Day # 15 [facility] noted  Abdominal: soft, non-tender;+ bowel sounds; + BM  Extremities: extremities normal, atraumatic, trace non pitting pedal edema  Pedal Pulses: 1+ and symmetric  Right groin incision from [facility] SANDY; C/D/I        Assessment and Plan:   S/P Emergent Cab 10/31/2017   TROP 0.00 03/30/2018   B12 386 03/06/2018       Xr Chest Ap Portable  (cpt=71045)    Result Date: 4/11/2018  CONCLUSION:  1.  Persistent but significantly improved right basilar airspace disease, most suggestive of resolving pneumonia or less l

## 2018-04-12 NOTE — PHYSICAL THERAPY NOTE
PT treatment attempted but patient refusing secondary to just having a bad day.  \"I had it all in my head that I was going to rehab today\"

## 2018-04-12 NOTE — CM/SW NOTE
GRIS informed of possible dc to rehab tomorrow. GRIS spoke with [facility] snf admissions to alert of this update. GRIS sent updated clinical records including PT/OT from yesterday 4/11 to the snf to continue insurance authorization.     salty lee,JOHANNY [id number]

## 2018-04-12 NOTE — PLAN OF CARE
GASTROINTESTINAL - ADULT    • Maintains or returns to baseline bowel function Adequate for Discharge        PAIN - ADULT    • Verbalizes/displays adequate comfort level or patient's stated pain goal Adequate for Discharge        RESPIRATORY - ADULT    • Ac

## 2018-04-12 NOTE — PROGRESS NOTES
[facility] AND [facility] Infectious Disease Progress Note    Joslyn Santillan Patient Status:  Inpatient    3/25/1950 MRN [id number]   Location [facility] 2W/SW Attending Elmyra Nageotte, MD   [facility] Day # 15 PCP Bertha Barlow MD     Subjective:  Pt s PRN  •  ondansetron HCl (ZOFRAN) injection 4 mg, 4 mg, Intravenous, Q6H PRN  •  [DISCONTINUED] potassium chloride 20 mEq in sodium chloride 0.9% 100 mL IVPB, 20 mEq, Intravenous, PRN **OR** potassium chloride IVPB premix 40 mEq, 40 mEq, Intravenous, PRN  • flexion of cervical spine. No JVD. Supple. Lungs: Clear to auscultation bilaterally. Cardiac: Regular rate and rhythm. No murmur. Abdomen:  Soft, non-distended, non-tender, with no rebound or guarding. No peritoneal signs. No ascites.   Liver is withi

## 2018-04-12 NOTE — PROGRESS NOTES
[facility]  Progress Note    Moshe Patricia Patient Status:  Inpatient    3/25/1950 MRN [id number]   Location [facility] 2W/SW Attending Hayden Moser MD   Hosp Day # 15 PCP Johan Easley MD     Impression:  [facility] Oral BID with meals   CefTRIAXone Sodium (ROCEPHIN) 1 g in sodium chloride 0.9 % 100 mL MBP/ADD-vantage 1 g Intravenous Q24H   HydrOXYzine HCl (ATARAX) tab 25 mg 25 mg Oral TID PRN   hydrALAzine HCl (APRESOLINE) injection 5 mg 5 mg Intravenous Q4H PRN   PE Data:        Telemetry: SR, PACs        Lab Results  Component Value Date   WBC 23.5 04/12/2018   HGB 10.8 04/12/2018   HCT 33.4 04/12/2018    04/12/2018       Lab Results  Component Value Date   INR 4.0 (H) 04/12/2018   INR 3.1 (H) 04/11/2018   INR

## 2018-04-12 NOTE — CONSULTS
[facility]    Report of Consultation    Dav Pulido Patient Status:  Inpatient    3/25/1950 MRN [id number]   Location [facility] 2W/SW Attending Jennifer Olivares MD   Hosp Day # 12 PCP Mariama Galloway MD     Date of Admission: psychiatrist and she is happy that she will take that her medicine to help her. Apparently her family has been complaining about Ativan and her primary physician give her Atarax which cause her drowsy and runny nose.     Today the patient cooperative but r Hypertension Father    • Heart Disorder Mother    • Hypertension Mother    • Heart Disorder Daughter    • Lung Disorder Neg        Social History  Smoking status: Former Smoker                                                              Packs/day: 1.50 acetaminophen (TYLENOL) tab 650 mg 650 mg Oral Q4H PRN   Or      HYDROcodone-acetaminophen (NORCO) 5-325 MG per tab 1 tablet 1 tablet Oral Q4H PRN   Or      HYDROcodone-acetaminophen (NORCO) 5-325 MG per tab 2 tablet 2 tablet Oral Q4H PRN   Yovanny VALADEZ (H) 04/11/2018    04/11/2018   K 3.9 04/11/2018   K 3.9 04/11/2018    04/11/2018   CO2 25 04/11/2018    (H) 04/11/2018   CA 8.6 04/11/2018   ALB 2.7 (L) 04/01/2018   ALKPHO 39 04/01/2018   TP 4.8 (L) 04/01/2018   AST 55 (H) 04/01/2018 appropriate. Impression:   Impression:      Generalized anxiety disorder. Major depressive disorder, single moderate. Discussed risk and benefit, acknowledging the current symptom and severity.   The patient with history of chronic anxiety for many y PROFILE      Prothrombin Time (PT)      Platelet Count      PTT, Activated      Fibrinogen Activity      D-Dimer      Heparin Induced Platelet      CBC With Differential With Platelet      Basic Metabolic Panel (8)      Magnesium      Urinalysis with Cultu CBC With Differential With Platelet      Prothrombin Time (PT)      Basic Metabolic Panel (8)      Arterial blood gas      CBC With Differential With Platelet      Basic Metabolic Panel (8)      Basic Metabolic Panel (8)      CBC With Differential With Alivia Once      Prepare RBC Once      Prepare fresh frozen plasma Once      Prepare platelets Once      Prepare RBC Once      Prepare RBC Once      Direct Antibody Test Once      Type and screen STAT      ABORH (Blood Type) Once      Antibody Screen Once      Pr

## 2018-04-13 ENCOUNTER — APPOINTMENT (OUTPATIENT)
Dept: NUCLEAR MEDICINE | Facility: HOSPITAL | Age: 68
DRG: 215 | End: 2018-04-13
Attending: INTERNAL MEDICINE
Payer: MEDICARE

## 2018-04-13 VITALS
HEIGHT: 64 IN | OXYGEN SATURATION: 96 % | DIASTOLIC BLOOD PRESSURE: 87 MMHG | SYSTOLIC BLOOD PRESSURE: 123 MMHG | BODY MASS INDEX: 20.08 KG/M2 | TEMPERATURE: 99 F | HEART RATE: 66 BPM | WEIGHT: 117.63 LBS | RESPIRATION RATE: 25 BRPM

## 2018-04-13 PROCEDURE — 02PYX3Z REMOVAL OF INFUSION DEVICE FROM GREAT VESSEL, EXTERNAL APPROACH: ICD-10-PCS | Performed by: HOSPITALIST

## 2018-04-13 PROCEDURE — 99233 SBSQ HOSP IP/OBS HIGH 50: CPT | Performed by: OTHER

## 2018-04-13 RX ORDER — POTASSIUM CHLORIDE 20 MEQ/1
40 TABLET, EXTENDED RELEASE ORAL ONCE
Status: COMPLETED | OUTPATIENT
Start: 2018-04-13 | End: 2018-04-13

## 2018-04-13 RX ORDER — DOXEPIN HYDROCHLORIDE 50 MG/1
1 CAPSULE ORAL DAILY
Qty: 30 TABLET | Refills: 0 | Status: SHIPPED | OUTPATIENT
Start: 2018-04-14 | End: 2018-09-13

## 2018-04-13 RX ORDER — CEFUROXIME AXETIL 500 MG/1
500 TABLET ORAL 2 TIMES DAILY
Qty: 14 TABLET | Refills: 0 | Status: SHIPPED | OUTPATIENT
Start: 2018-04-14 | End: 2018-04-21

## 2018-04-13 RX ORDER — LORAZEPAM 2 MG/ML
1 INJECTION INTRAMUSCULAR
Status: DISCONTINUED | OUTPATIENT
Start: 2018-04-13 | End: 2018-04-13

## 2018-04-13 RX ORDER — WARFARIN SODIUM 1 MG/1
0.5 TABLET ORAL NIGHTLY
Qty: 30 TABLET | Refills: 0 | Status: SHIPPED | OUTPATIENT
Start: 2018-04-16 | End: 2018-05-01

## 2018-04-13 RX ORDER — FAMOTIDINE 20 MG/1
20 TABLET ORAL DAILY
Qty: 30 TABLET | Refills: 0 | Status: SHIPPED | OUTPATIENT
Start: 2018-04-14 | End: 2018-06-05

## 2018-04-13 RX ORDER — ARIPIPRAZOLE 2 MG/1
1 TABLET ORAL NIGHTLY
Qty: 30 TABLET | Refills: 0 | Status: SHIPPED | OUTPATIENT
Start: 2018-04-13 | End: 2018-09-13

## 2018-04-13 RX ORDER — MIRTAZAPINE 15 MG/1
15 TABLET, FILM COATED ORAL NIGHTLY
Qty: 30 TABLET | Refills: 0 | Status: SHIPPED | OUTPATIENT
Start: 2018-04-13 | End: 2018-05-08

## 2018-04-13 RX ORDER — AMIODARONE HYDROCHLORIDE 200 MG/1
200 TABLET ORAL 2 TIMES DAILY
Qty: 60 TABLET | Refills: 0 | Status: SHIPPED | OUTPATIENT
Start: 2018-04-13 | End: 2018-05-01

## 2018-04-13 RX ORDER — HALOPERIDOL 5 MG/ML
1 INJECTION INTRAMUSCULAR
Status: DISCONTINUED | OUTPATIENT
Start: 2018-04-13 | End: 2018-04-13

## 2018-04-13 RX ORDER — TRAMADOL HYDROCHLORIDE 50 MG/1
50 TABLET ORAL EVERY 6 HOURS PRN
Qty: 20 TABLET | Refills: 0 | Status: SHIPPED | OUTPATIENT
Start: 2018-04-13 | End: 2019-07-27

## 2018-04-13 RX ORDER — METOPROLOL TARTRATE 50 MG/1
50 TABLET, FILM COATED ORAL
Qty: 60 TABLET | Refills: 0 | Status: SHIPPED | OUTPATIENT
Start: 2018-04-13 | End: 2018-04-23

## 2018-04-13 RX ORDER — ALPRAZOLAM 0.25 MG/1
0.25 TABLET ORAL DAILY PRN
Qty: 10 TABLET | Refills: 0 | Status: SHIPPED | OUTPATIENT
Start: 2018-04-13 | End: 2018-05-02

## 2018-04-13 RX ORDER — ASCORBIC ACID 500 MG
500 TABLET ORAL 3 TIMES DAILY
Qty: 90 TABLET | Refills: 0 | Status: SHIPPED | OUTPATIENT
Start: 2018-04-13 | End: 2018-04-23

## 2018-04-13 RX ORDER — HALOPERIDOL 5 MG/ML
0.5 INJECTION INTRAMUSCULAR EVERY 6 HOURS PRN
Status: DISCONTINUED | OUTPATIENT
Start: 2018-04-13 | End: 2018-04-13

## 2018-04-13 RX ORDER — ARIPIPRAZOLE 2 MG/1
1 TABLET ORAL NIGHTLY
Status: DISCONTINUED | OUTPATIENT
Start: 2018-04-13 | End: 2018-04-13

## 2018-04-13 RX ORDER — ALPRAZOLAM 0.25 MG/1
0.25 TABLET ORAL 2 TIMES DAILY PRN
Status: DISCONTINUED | OUTPATIENT
Start: 2018-04-13 | End: 2018-04-13

## 2018-04-13 RX ORDER — 0.9 % SODIUM CHLORIDE 0.9 %
VIAL (ML) INJECTION
Status: DISCONTINUED
Start: 2018-04-13 | End: 2018-04-13

## 2018-04-13 RX ORDER — HYDROCODONE BITARTRATE AND ACETAMINOPHEN 5; 325 MG/1; MG/1
2 TABLET ORAL EVERY 4 HOURS PRN
Qty: 30 TABLET | Refills: 0 | Status: SHIPPED | OUTPATIENT
Start: 2018-04-13 | End: 2018-04-13

## 2018-04-13 NOTE — PROGRESS NOTES
[facility] [address] [place] [address] Cardiology  Progress Note    Raven Panchal Patient Status:  Inpatient    3/25/1950 MRN [id number]   Location [facility] 2W/SW Attending Diana Hays MD   Hosp Day # 15 PCP Kamaljit Domingo MD     Impression:  [facility] injection 0.5 mg 0.5 mg Intramuscular Q6H PRN   haloperidol lactate (HALDOL) 5 MG/ML injection 1 mg 1 mg Intramuscular Daily PRN   LORazepam (ATIVAN) injection 1 mg 1 mg Intramuscular Daily PRN   ARIPiprazole (ABILIFY) tab 1 mg 1 mg Oral Nightly   mirtazap 8.6 mg Oral BID   acetaminophen (TYLENOL) tab 650 mg 650 mg Oral Q6H PRN   morphINE Sulfate IR (MSIR) tab 30 mg 30 mg Oral Q4H PRN     Facility-Administered Medications Ordered in Other Encounters:  propofol (DIPRIVAN) injection  Intravenous PRN   succinyl

## 2018-04-13 NOTE — PROGRESS NOTES
Pulmonary Progress Note     Assessment / Plan:  1. Hypoxemic respiratory failure - extubated 4/3. Resolved  - on RA now  - diuresis per cards  - IS or ezpap  2. Leukocytosis - no fever. Trending down  - did not tolerate WBC scan  - abx per ID  3.  PAD - lik

## 2018-04-13 NOTE — PROGRESS NOTES
[facility] Infectious Disease Progress Note    Ricardo Bowling Patient Status:  Inpatient    3/25/1950 MRN [id number]   Location [facility] 2W/SW Attending Kristen Waite MD   Hosp Day # 14 PCP Kalpana Gonzales MD     Subjective:  Helen Intravenous, Q6H PRN  •  [DISCONTINUED] potassium chloride 20 mEq in sodium chloride 0.9% 100 mL IVPB, 20 mEq, Intravenous, PRN **OR** potassium chloride IVPB premix 40 mEq, 40 mEq, Intravenous, PRN  •  Magnesium Sulfate in D5W IVPB premix 1 g, 1 g, Claudia Wheeler cyanosis. Skin: Normal texture and turgor. Neurologic: Cranial nerves are grossly intact. Motor strength and sensory examination is grossly normal.  No focal neurologic deficit.     Labs:    Lab Results  Component Value Date   WBC 17.5 04/13/2018   HGB Adilene Allen MD  4/13/2018  1:02 PM

## 2018-04-13 NOTE — PROGRESS NOTES
[place] [facility] HOSP - [facility]    Progress Note    Pamela Harris Patient Status:  Inpatient    3/25/1950 MRN [id number]   Location [facility] 2W/SW Attending Ximena Dutton MD   Hosp Day # 14 PCP Joselin Gonzalez MD     Subjective:  Pt.  Sitting in 4\" (1.626 m), weight 117 lb 9.6 oz (53.3 kg), SpO2 94 %, not currently breastfeeding.   General: NAD  Neck: No JVD  Lungs: clear bilaterally  Heart: RRR, S1, S2  Abdomen: Soft, NT/ND, BS+x4, +flatus, +BM   Extremities: Warm, dry, Left 1+ LE edema (suspect

## 2018-04-13 NOTE — PLAN OF CARE
Report given to [facility] . Medications, plan of care, assessment reviewed. All questions asked were answered. No further questions. PICC line removed via TL Jessica.  Transport to be here between 4:30 and 5 PM. Will remove monitor once ready to tra

## 2018-04-13 NOTE — DISCHARGE SUMMARY
General Medicine Discharge Summary     Patient ID:  Beth Garcia  77year old  3/25/1950    Admit date: 3/29/2018    Discharge date and time: 4/13/18    Attending Physician: Adelia Palacios MD     Consults: IP CONSULT TO HOSPITALIST  IP CONSULT TO CARDIOLOGY distress  Pulm: Lungs clear, normal respiratory effort  CV: Heart with regular rate and rhythm  Abd: Abdomen soft,       HPI:   Per Dr. Aquino Bath:  Bella Chu is a(n) 77year old female p/w acute onset anterior chest pain associated w/ dyspnea and palpit stopped due to MICHELLE resume as outpatient  - no Life vest per cards   - repeat ECHO as outpatient    Post op Afib  - amio BID per Cards  - on warfarin INR 3.1 on DC, (quick rise with 4 mg) repeat INR daily until less than 2.5, then resume at low dose 0.5mg normal     Coagulopathy - improved   - per heme likely shock, surgery, sepsis, and AC use  - s/p multiple units 8 units FFP, 3 units cryo     Acute blood loss anemia  - 2/2 blood loss in OR  - s/p 7 units pRBC intraop, 2u 4/3  - Hg now stable 11 on DC    mg total) by mouth nightly. ascorbic acid 500 MG Tabs  Commonly known as:  VITAMIN C  Take 1 tablet (500 mg total) by mouth 3 (three) times daily.      Cefuroxime Axetil 500 MG Tabs  Commonly known as:  CEFTIN  Take 1 tablet (500 mg total) by mouth 2 (t Tabs  · Cefuroxime Axetil 500 MG Tabs  · famoTIDine 20 MG Tabs  · Metoprolol Tartrate 50 MG Tabs  · mirtazapine 15 MG Tabs  · multivitamin Tabs  · nystatin 694388 UNIT/ML Susp  · TraMADol HCl 50 MG Tabs  · Warfarin Sodium 1 MG Tabs         FU  Follow-up In for 3 months. Place Xeroform to distal necrotic area near knee, change twice a day and cover with gauze until your follow up appt. With Dr. Fadumo Keene for re-evaluation. Please Check INR daily, and resume Warfarin at low dose 0.5 mg, once INR is < 2.5. on:  4/14/2018     nystatin 295558 UNIT/ML Susp  Commonly known as:  MYCOSTATIN  Take 5 mL (500,000 Units total) by mouth 4 (four) times daily. Warfarin Sodium 1 MG Tabs  Commonly known as:  COUMADIN  Take 0.5 tablets (0.5 mg total) by mouth nightly.

## 2018-04-13 NOTE — CM/SW NOTE
2:00pm Update- [facility] snf confirmed bed and insurance auth for today. Pt is aware and agreeable to transfer today via [address], aware of associated costs. This has been arranged for today 4/13 at 4pm. [facility]     Report [phone]

## 2018-04-14 NOTE — PROGRESS NOTES
The patient seen today for over 35 minute over 50% counseling and managing treatment plan. The patient was shaky anxious and tearful.   The patient reported that she is very upset that she is sent her to the nuclear study without informing her about the

## 2018-04-18 PROBLEM — I25.810 CORONARY ARTERY DISEASE INVOLVING CORONARY BYPASS GRAFT OF NATIVE HEART WITHOUT ANGINA PECTORIS: Status: ACTIVE | Noted: 2018-04-18

## 2018-04-20 ENCOUNTER — SNF VISIT (OUTPATIENT)
Dept: INTERNAL MEDICINE CLINIC | Facility: SKILLED NURSING FACILITY | Age: 68
End: 2018-04-20

## 2018-04-20 VITALS
TEMPERATURE: 97 F | WEIGHT: 104.94 LBS | BODY MASS INDEX: 18 KG/M2 | SYSTOLIC BLOOD PRESSURE: 156 MMHG | DIASTOLIC BLOOD PRESSURE: 72 MMHG | HEART RATE: 60 BPM | RESPIRATION RATE: 18 BRPM

## 2018-04-20 DIAGNOSIS — Z95.1 HX OF CABG: Primary | ICD-10-CM

## 2018-04-20 PROCEDURE — 99308 SBSQ NF CARE LOW MDM 20: CPT | Performed by: CLINICAL NURSE SPECIALIST

## 2018-04-20 NOTE — PROGRESS NOTES
Pt. Seen at [facility] today. Pt. S/P EMERGENT CABG x 2 on 3/30/18 who was discharged from the [facility] on 4/13/18. BP: 156/72, HR:60, RR:18, Temp: 97.2, Wgt: 47.6kg. (Discharge wgt from [place]: 53.3)  Pt. In her room.  She c

## 2018-04-23 ENCOUNTER — TELEPHONE (OUTPATIENT)
Dept: MEDSURG UNIT | Facility: HOSPITAL | Age: 68
End: 2018-04-23

## 2018-05-04 PROCEDURE — 87086 URINE CULTURE/COLONY COUNT: CPT | Performed by: PHYSICIAN ASSISTANT

## 2018-05-31 ENCOUNTER — CARDPULM VISIT (OUTPATIENT)
Dept: CARDIAC REHAB | Facility: HOSPITAL | Age: 68
End: 2018-05-31
Attending: INTERNAL MEDICINE
Payer: MEDICARE

## 2018-05-31 DIAGNOSIS — Z98.61 S/P PTCA (PERCUTANEOUS TRANSLUMINAL CORONARY ANGIOPLASTY): ICD-10-CM

## 2018-06-06 ENCOUNTER — CARDPULM VISIT (OUTPATIENT)
Dept: CARDIAC REHAB | Facility: HOSPITAL | Age: 68
End: 2018-06-06
Attending: INTERNAL MEDICINE
Payer: MEDICARE

## 2018-06-06 PROCEDURE — 93798 PHYS/QHP OP CAR RHAB W/ECG: CPT

## 2018-06-08 ENCOUNTER — CARDPULM VISIT (OUTPATIENT)
Dept: CARDIAC REHAB | Facility: HOSPITAL | Age: 68
End: 2018-06-08
Attending: INTERNAL MEDICINE
Payer: MEDICARE

## 2018-06-08 PROCEDURE — 93798 PHYS/QHP OP CAR RHAB W/ECG: CPT

## 2018-06-11 ENCOUNTER — CARDPULM VISIT (OUTPATIENT)
Dept: CARDIAC REHAB | Facility: HOSPITAL | Age: 68
End: 2018-06-11
Attending: INTERNAL MEDICINE
Payer: MEDICARE

## 2018-06-11 PROCEDURE — 93798 PHYS/QHP OP CAR RHAB W/ECG: CPT

## 2018-06-13 ENCOUNTER — CARDPULM VISIT (OUTPATIENT)
Dept: CARDIAC REHAB | Facility: HOSPITAL | Age: 68
End: 2018-06-13
Attending: INTERNAL MEDICINE
Payer: MEDICARE

## 2018-06-13 PROCEDURE — 93798 PHYS/QHP OP CAR RHAB W/ECG: CPT

## 2018-06-15 ENCOUNTER — APPOINTMENT (OUTPATIENT)
Dept: CARDIAC REHAB | Facility: HOSPITAL | Age: 68
End: 2018-06-15
Attending: INTERNAL MEDICINE
Payer: MEDICARE

## 2018-06-18 ENCOUNTER — CARDPULM VISIT (OUTPATIENT)
Dept: CARDIAC REHAB | Facility: HOSPITAL | Age: 68
End: 2018-06-18
Attending: INTERNAL MEDICINE
Payer: MEDICARE

## 2018-06-18 PROCEDURE — 93798 PHYS/QHP OP CAR RHAB W/ECG: CPT

## 2018-06-20 ENCOUNTER — CARDPULM VISIT (OUTPATIENT)
Dept: CARDIAC REHAB | Facility: HOSPITAL | Age: 68
End: 2018-06-20
Attending: INTERNAL MEDICINE
Payer: MEDICARE

## 2018-06-20 PROCEDURE — 93798 PHYS/QHP OP CAR RHAB W/ECG: CPT

## 2018-06-22 ENCOUNTER — APPOINTMENT (OUTPATIENT)
Dept: CARDIAC REHAB | Facility: HOSPITAL | Age: 68
End: 2018-06-22
Attending: INTERNAL MEDICINE
Payer: MEDICARE

## 2018-06-25 ENCOUNTER — CARDPULM VISIT (OUTPATIENT)
Dept: CARDIAC REHAB | Facility: HOSPITAL | Age: 68
End: 2018-06-25
Attending: INTERNAL MEDICINE
Payer: MEDICARE

## 2018-06-25 PROCEDURE — 93798 PHYS/QHP OP CAR RHAB W/ECG: CPT

## 2018-06-27 ENCOUNTER — CARDPULM VISIT (OUTPATIENT)
Dept: CARDIAC REHAB | Facility: HOSPITAL | Age: 68
End: 2018-06-27
Attending: INTERNAL MEDICINE
Payer: MEDICARE

## 2018-06-27 PROCEDURE — 93798 PHYS/QHP OP CAR RHAB W/ECG: CPT

## 2018-06-29 ENCOUNTER — CARDPULM VISIT (OUTPATIENT)
Dept: CARDIAC REHAB | Facility: HOSPITAL | Age: 68
End: 2018-06-29
Attending: INTERNAL MEDICINE
Payer: MEDICARE

## 2018-06-29 PROCEDURE — 93798 PHYS/QHP OP CAR RHAB W/ECG: CPT

## 2018-07-02 ENCOUNTER — CARDPULM VISIT (OUTPATIENT)
Dept: CARDIAC REHAB | Facility: HOSPITAL | Age: 68
End: 2018-07-02
Attending: INTERNAL MEDICINE
Payer: MEDICARE

## 2018-07-02 PROCEDURE — 93798 PHYS/QHP OP CAR RHAB W/ECG: CPT

## 2018-07-06 ENCOUNTER — CARDPULM VISIT (OUTPATIENT)
Dept: CARDIAC REHAB | Facility: HOSPITAL | Age: 68
End: 2018-07-06
Attending: INTERNAL MEDICINE
Payer: MEDICARE

## 2018-07-06 PROCEDURE — 93798 PHYS/QHP OP CAR RHAB W/ECG: CPT

## 2018-07-09 ENCOUNTER — CARDPULM VISIT (OUTPATIENT)
Dept: CARDIAC REHAB | Facility: HOSPITAL | Age: 68
End: 2018-07-09
Attending: INTERNAL MEDICINE
Payer: MEDICARE

## 2018-07-09 PROCEDURE — 93798 PHYS/QHP OP CAR RHAB W/ECG: CPT

## 2018-07-10 PROBLEM — I50.22 CHRONIC SYSTOLIC CHF (CONGESTIVE HEART FAILURE) (HCC): Status: ACTIVE | Noted: 2018-07-10

## 2018-07-11 ENCOUNTER — APPOINTMENT (OUTPATIENT)
Dept: CARDIAC REHAB | Facility: HOSPITAL | Age: 68
End: 2018-07-11
Attending: INTERNAL MEDICINE
Payer: MEDICARE

## 2018-07-11 PROCEDURE — 93798 PHYS/QHP OP CAR RHAB W/ECG: CPT

## 2018-07-13 ENCOUNTER — CARDPULM VISIT (OUTPATIENT)
Dept: CARDIAC REHAB | Facility: HOSPITAL | Age: 68
End: 2018-07-13
Attending: INTERNAL MEDICINE
Payer: MEDICARE

## 2018-07-13 PROCEDURE — 93798 PHYS/QHP OP CAR RHAB W/ECG: CPT

## 2018-07-16 ENCOUNTER — APPOINTMENT (OUTPATIENT)
Dept: CARDIAC REHAB | Facility: HOSPITAL | Age: 68
End: 2018-07-16
Attending: INTERNAL MEDICINE
Payer: MEDICARE

## 2018-07-16 PROCEDURE — 93798 PHYS/QHP OP CAR RHAB W/ECG: CPT

## 2018-07-18 ENCOUNTER — CARDPULM VISIT (OUTPATIENT)
Dept: CARDIAC REHAB | Facility: HOSPITAL | Age: 68
End: 2018-07-18
Attending: INTERNAL MEDICINE
Payer: MEDICARE

## 2018-07-18 PROCEDURE — 93798 PHYS/QHP OP CAR RHAB W/ECG: CPT

## 2018-07-20 ENCOUNTER — CARDPULM VISIT (OUTPATIENT)
Dept: CARDIAC REHAB | Facility: HOSPITAL | Age: 68
End: 2018-07-20
Attending: INTERNAL MEDICINE
Payer: MEDICARE

## 2018-07-20 PROCEDURE — 93798 PHYS/QHP OP CAR RHAB W/ECG: CPT

## 2018-07-23 ENCOUNTER — CARDPULM VISIT (OUTPATIENT)
Dept: CARDIAC REHAB | Facility: HOSPITAL | Age: 68
End: 2018-07-23
Attending: INTERNAL MEDICINE
Payer: MEDICARE

## 2018-07-23 PROCEDURE — 93798 PHYS/QHP OP CAR RHAB W/ECG: CPT

## 2018-07-25 ENCOUNTER — CARDPULM VISIT (OUTPATIENT)
Dept: CARDIAC REHAB | Facility: HOSPITAL | Age: 68
End: 2018-07-25
Attending: INTERNAL MEDICINE
Payer: MEDICARE

## 2018-07-25 PROCEDURE — 93798 PHYS/QHP OP CAR RHAB W/ECG: CPT

## 2018-07-27 ENCOUNTER — APPOINTMENT (OUTPATIENT)
Dept: CARDIAC REHAB | Facility: HOSPITAL | Age: 68
End: 2018-07-27
Attending: INTERNAL MEDICINE
Payer: MEDICARE

## 2018-07-30 ENCOUNTER — CARDPULM VISIT (OUTPATIENT)
Dept: CARDIAC REHAB | Facility: HOSPITAL | Age: 68
End: 2018-07-30
Attending: INTERNAL MEDICINE
Payer: MEDICARE

## 2018-07-30 PROCEDURE — 93798 PHYS/QHP OP CAR RHAB W/ECG: CPT

## 2018-08-01 ENCOUNTER — APPOINTMENT (OUTPATIENT)
Dept: CARDIAC REHAB | Facility: HOSPITAL | Age: 68
End: 2018-08-01
Attending: INTERNAL MEDICINE
Payer: MEDICARE

## 2018-08-03 ENCOUNTER — APPOINTMENT (OUTPATIENT)
Dept: CARDIAC REHAB | Facility: HOSPITAL | Age: 68
End: 2018-08-03
Attending: INTERNAL MEDICINE
Payer: MEDICARE

## 2018-08-03 PROCEDURE — 93798 PHYS/QHP OP CAR RHAB W/ECG: CPT

## 2018-08-06 ENCOUNTER — APPOINTMENT (OUTPATIENT)
Dept: CARDIAC REHAB | Facility: HOSPITAL | Age: 68
End: 2018-08-06
Attending: INTERNAL MEDICINE
Payer: MEDICARE

## 2018-08-06 PROCEDURE — 93798 PHYS/QHP OP CAR RHAB W/ECG: CPT

## 2018-08-08 ENCOUNTER — APPOINTMENT (OUTPATIENT)
Dept: CARDIAC REHAB | Facility: HOSPITAL | Age: 68
End: 2018-08-08
Attending: INTERNAL MEDICINE
Payer: MEDICARE

## 2018-08-10 ENCOUNTER — APPOINTMENT (OUTPATIENT)
Dept: CARDIAC REHAB | Facility: HOSPITAL | Age: 68
End: 2018-08-10
Attending: INTERNAL MEDICINE
Payer: MEDICARE

## 2018-08-13 ENCOUNTER — APPOINTMENT (OUTPATIENT)
Dept: CARDIAC REHAB | Facility: HOSPITAL | Age: 68
End: 2018-08-13
Attending: INTERNAL MEDICINE
Payer: MEDICARE

## 2018-08-13 PROCEDURE — 93798 PHYS/QHP OP CAR RHAB W/ECG: CPT

## 2018-08-15 ENCOUNTER — APPOINTMENT (OUTPATIENT)
Dept: CARDIAC REHAB | Facility: HOSPITAL | Age: 68
End: 2018-08-15
Attending: INTERNAL MEDICINE
Payer: MEDICARE

## 2018-08-15 PROCEDURE — 93798 PHYS/QHP OP CAR RHAB W/ECG: CPT

## 2018-08-16 ENCOUNTER — HOSPITAL ENCOUNTER (OUTPATIENT)
Dept: GENERAL RADIOLOGY | Facility: HOSPITAL | Age: 68
Discharge: HOME | End: 2018-08-16
Attending: PHYSICIAN ASSISTANT
Payer: MEDICARE

## 2018-08-16 DIAGNOSIS — R05.9 COUGH: ICD-10-CM

## 2018-08-16 PROCEDURE — 71046 X-RAY EXAM CHEST 2 VIEWS: CPT | Performed by: PHYSICIAN ASSISTANT

## 2018-08-17 ENCOUNTER — APPOINTMENT (OUTPATIENT)
Dept: CARDIAC REHAB | Facility: HOSPITAL | Age: 68
End: 2018-08-17
Attending: INTERNAL MEDICINE
Payer: MEDICARE

## 2018-08-17 PROCEDURE — 93798 PHYS/QHP OP CAR RHAB W/ECG: CPT

## 2018-08-20 ENCOUNTER — APPOINTMENT (OUTPATIENT)
Dept: CARDIAC REHAB | Facility: HOSPITAL | Age: 68
End: 2018-08-20
Attending: INTERNAL MEDICINE
Payer: MEDICARE

## 2018-08-20 PROCEDURE — 93798 PHYS/QHP OP CAR RHAB W/ECG: CPT

## 2018-08-22 ENCOUNTER — APPOINTMENT (OUTPATIENT)
Dept: CARDIAC REHAB | Facility: HOSPITAL | Age: 68
End: 2018-08-22
Attending: INTERNAL MEDICINE
Payer: MEDICARE

## 2018-08-22 PROCEDURE — 93798 PHYS/QHP OP CAR RHAB W/ECG: CPT

## 2018-08-24 ENCOUNTER — APPOINTMENT (OUTPATIENT)
Dept: CARDIAC REHAB | Facility: HOSPITAL | Age: 68
End: 2018-08-24
Attending: INTERNAL MEDICINE
Payer: MEDICARE

## 2018-08-24 ENCOUNTER — APPOINTMENT (OUTPATIENT)
Dept: GENERAL RADIOLOGY | Facility: HOSPITAL | Age: 68
End: 2018-08-24
Attending: EMERGENCY MEDICINE
Payer: MEDICARE

## 2018-08-24 ENCOUNTER — HOSPITAL ENCOUNTER (EMERGENCY)
Facility: HOSPITAL | Age: 68
Discharge: HOME | End: 2018-08-24
Attending: EMERGENCY MEDICINE
Payer: MEDICARE

## 2018-08-24 VITALS
WEIGHT: 106 LBS | HEART RATE: 62 BPM | HEIGHT: 64 IN | DIASTOLIC BLOOD PRESSURE: 86 MMHG | BODY MASS INDEX: 18.1 KG/M2 | SYSTOLIC BLOOD PRESSURE: 167 MMHG | OXYGEN SATURATION: 100 % | TEMPERATURE: 97 F | RESPIRATION RATE: 18 BRPM

## 2018-08-24 DIAGNOSIS — R07.9 CHEST PAIN OF UNCERTAIN ETIOLOGY: Primary | ICD-10-CM

## 2018-08-24 LAB
ANION GAP SERPL CALC-SCNC: 8 MMOL/L (ref 0–18)
BASOPHILS # BLD: 0.1 K/UL (ref 0–0.2)
BASOPHILS NFR BLD: 1 %
BUN SERPL-MCNC: 16 MG/DL (ref 8–20)
BUN/CREAT SERPL: 11.3 (ref 10–20)
CALCIUM SERPL-MCNC: 9.2 MG/DL (ref 8.5–10.5)
CHLORIDE SERPL-SCNC: 100 MMOL/L (ref 95–110)
CO2 SERPL-SCNC: 28 MMOL/L (ref 22–32)
CREAT SERPL-MCNC: 1.42 MG/DL (ref 0.5–1.5)
EOSINOPHIL # BLD: 0.4 K/UL (ref 0–0.7)
EOSINOPHIL NFR BLD: 6 %
ERYTHROCYTE [DISTWIDTH] IN BLOOD BY AUTOMATED COUNT: 14 % (ref 11–15)
GLUCOSE SERPL-MCNC: 71 MG/DL (ref 70–99)
HCT VFR BLD AUTO: 42.3 % (ref 35–48)
HGB BLD-MCNC: 14.1 G/DL (ref 12–16)
LYMPHOCYTES # BLD: 2.5 K/UL (ref 1–4)
LYMPHOCYTES NFR BLD: 33 %
MCH RBC QN AUTO: 29 PG (ref 27–32)
MCHC RBC AUTO-ENTMCNC: 33.2 G/DL (ref 32–37)
MCV RBC AUTO: 87.1 FL (ref 80–100)
MONOCYTES # BLD: 0.7 K/UL (ref 0–1)
MONOCYTES NFR BLD: 9 %
NEUTROPHILS # BLD AUTO: 4 K/UL (ref 1.8–7.7)
NEUTROPHILS NFR BLD: 52 %
OSMOLALITY UR CALC.SUM OF ELEC: 282 MOSM/KG (ref 275–295)
PLATELET # BLD AUTO: 250 K/UL (ref 140–400)
PMV BLD AUTO: 8.4 FL (ref 7.4–10.3)
POTASSIUM SERPL-SCNC: 3.6 MMOL/L (ref 3.3–5.1)
RBC # BLD AUTO: 4.86 M/UL (ref 3.7–5.4)
SODIUM SERPL-SCNC: 136 MMOL/L (ref 136–144)
TROPONIN I SERPL-MCNC: 0.01 NG/ML (ref ?–0.03)
TROPONIN I SERPL-MCNC: 0.01 NG/ML (ref ?–0.03)
WBC # BLD AUTO: 7.8 K/UL (ref 4–11)

## 2018-08-24 PROCEDURE — 36415 COLL VENOUS BLD VENIPUNCTURE: CPT

## 2018-08-24 PROCEDURE — 71046 X-RAY EXAM CHEST 2 VIEWS: CPT | Performed by: EMERGENCY MEDICINE

## 2018-08-24 PROCEDURE — 99285 EMERGENCY DEPT VISIT HI MDM: CPT

## 2018-08-24 PROCEDURE — 84484 ASSAY OF TROPONIN QUANT: CPT | Performed by: EMERGENCY MEDICINE

## 2018-08-24 PROCEDURE — 85025 COMPLETE CBC W/AUTO DIFF WBC: CPT | Performed by: EMERGENCY MEDICINE

## 2018-08-24 PROCEDURE — 93005 ELECTROCARDIOGRAM TRACING: CPT

## 2018-08-24 PROCEDURE — 80048 BASIC METABOLIC PNL TOTAL CA: CPT | Performed by: EMERGENCY MEDICINE

## 2018-08-24 PROCEDURE — 93010 ELECTROCARDIOGRAM REPORT: CPT | Performed by: EMERGENCY MEDICINE

## 2018-08-24 NOTE — ED INITIAL ASSESSMENT (HPI)
C/o chest pain starting yesterday. Pt states she has hx of acid reflux and has been off for pantoprazole for a while and believes this pain is acid related. Pt denies nausea, diaphoresis, SOB.  States she called her doctor for a refill of pantoprazole and w

## 2018-08-24 NOTE — ED NOTES
Pt is c/o chest burning since yesterday, she called her doctor and she was instructed to go to the ED. Pt states she has heart burn and all she needs is omeprazole. Pt is very anxious to be here. Pt is awake and alert.

## 2018-08-24 NOTE — CONSULTS
[facility]    PATIENT'S NAME: Claudetta Mink   ATTENDING PHYSICIAN: Tabitha Willis MD   CONSULTING PHYSICIAN: Glenna Campos MD   PATIENT ACCOUNT#:   [id number]    LOCATION:  [address]  MEDICAL RECORD #:   [id number]       DATE OF 18, blood pressure 106/76, temperature 97 degrees Fahrenheit. HEENT:  Sclerae anicteric. Head normocephalic. NECK:  Supple. LUNGS:  Clear to auscultation. HEART:  Regular rhythm without S3 or murmur. ABDOMEN:  Soft, nontender.   EXTREMITIES:  No edema

## 2018-08-24 NOTE — ED PROVIDER NOTES
Patient Seen in: [facility] AND [facility] Emergency Department    History   Patient presents with:  Chest Pain Angina (cardiovascular)    Stated Complaint: sent by doc - chest pain     HPI    68-year-old female with history of significant coronary disease with LLC        Smoking status: Former Smoker                                                              Packs/day: 1.50      Years: 30.00        Quit date: 5/9/2006  Smokeless tobacco: Never Used                      Alcohol use: Yes           0.0 oz/week components within normal limits   TROPONIN I - Normal   CBC WITH DIFFERENTIAL WITH PLATELET    Narrative: The following orders were created for panel order CBC WITH DIFFERENTIAL WITH PLATELET.   Procedure                               Abnormality [address] [phone]    Call today      Mireille Calabrese MD  [address] [facility]  [phone]    Schedule an appointment as soon as possible for a visit in 3 days      We recommend that you sc

## 2018-08-27 ENCOUNTER — CARDPULM VISIT (OUTPATIENT)
Dept: CARDIAC REHAB | Facility: HOSPITAL | Age: 68
End: 2018-08-27
Attending: INTERNAL MEDICINE
Payer: MEDICARE

## 2018-08-27 PROCEDURE — 93798 PHYS/QHP OP CAR RHAB W/ECG: CPT

## 2018-08-29 ENCOUNTER — APPOINTMENT (OUTPATIENT)
Dept: CARDIAC REHAB | Facility: HOSPITAL | Age: 68
End: 2018-08-29
Attending: INTERNAL MEDICINE
Payer: MEDICARE

## 2018-08-31 ENCOUNTER — APPOINTMENT (OUTPATIENT)
Dept: CARDIAC REHAB | Facility: HOSPITAL | Age: 68
End: 2018-08-31
Attending: INTERNAL MEDICINE
Payer: MEDICARE

## 2018-08-31 PROCEDURE — 93798 PHYS/QHP OP CAR RHAB W/ECG: CPT

## 2018-09-05 ENCOUNTER — CARDPULM VISIT (OUTPATIENT)
Dept: CARDIAC REHAB | Facility: HOSPITAL | Age: 68
End: 2018-09-05
Attending: INTERNAL MEDICINE
Payer: MEDICARE

## 2018-09-05 PROCEDURE — 93798 PHYS/QHP OP CAR RHAB W/ECG: CPT

## 2018-09-07 ENCOUNTER — CARDPULM VISIT (OUTPATIENT)
Dept: CARDIAC REHAB | Facility: HOSPITAL | Age: 68
End: 2018-09-07
Attending: INTERNAL MEDICINE
Payer: MEDICARE

## 2018-09-07 PROCEDURE — 93798 PHYS/QHP OP CAR RHAB W/ECG: CPT

## 2018-09-10 ENCOUNTER — CARDPULM VISIT (OUTPATIENT)
Dept: CARDIAC REHAB | Facility: HOSPITAL | Age: 68
End: 2018-09-10
Attending: INTERNAL MEDICINE
Payer: MEDICARE

## 2018-09-10 PROCEDURE — 93798 PHYS/QHP OP CAR RHAB W/ECG: CPT

## 2018-09-12 ENCOUNTER — CARDPULM VISIT (OUTPATIENT)
Dept: CARDIAC REHAB | Facility: HOSPITAL | Age: 68
End: 2018-09-12
Attending: INTERNAL MEDICINE
Payer: MEDICARE

## 2018-09-12 PROCEDURE — 93798 PHYS/QHP OP CAR RHAB W/ECG: CPT

## 2018-09-14 ENCOUNTER — CARDPULM VISIT (OUTPATIENT)
Dept: CARDIAC REHAB | Facility: HOSPITAL | Age: 68
End: 2018-09-14
Attending: INTERNAL MEDICINE
Payer: MEDICARE

## 2018-09-14 PROCEDURE — 93798 PHYS/QHP OP CAR RHAB W/ECG: CPT

## 2018-09-17 ENCOUNTER — APPOINTMENT (OUTPATIENT)
Dept: CARDIAC REHAB | Facility: HOSPITAL | Age: 68
End: 2018-09-17
Attending: INTERNAL MEDICINE
Payer: MEDICARE

## 2018-09-19 ENCOUNTER — APPOINTMENT (OUTPATIENT)
Dept: CARDIAC REHAB | Facility: HOSPITAL | Age: 68
End: 2018-09-19
Attending: INTERNAL MEDICINE
Payer: MEDICARE

## 2019-02-04 ENCOUNTER — HOSPITAL ENCOUNTER (EMERGENCY)
Facility: HOSPITAL | Age: 69
Discharge: HOME | End: 2019-02-04
Payer: MEDICARE

## 2019-02-04 ENCOUNTER — APPOINTMENT (OUTPATIENT)
Dept: GENERAL RADIOLOGY | Facility: HOSPITAL | Age: 69
End: 2019-02-04
Payer: MEDICARE

## 2019-02-04 VITALS
TEMPERATURE: 98 F | BODY MASS INDEX: 20.66 KG/M2 | HEIGHT: 64 IN | RESPIRATION RATE: 18 BRPM | DIASTOLIC BLOOD PRESSURE: 71 MMHG | OXYGEN SATURATION: 96 % | WEIGHT: 121 LBS | HEART RATE: 63 BPM | SYSTOLIC BLOOD PRESSURE: 139 MMHG

## 2019-02-04 DIAGNOSIS — R07.89 CHEST TIGHTNESS: Primary | ICD-10-CM

## 2019-02-04 LAB
ANION GAP SERPL CALC-SCNC: 11 MMOL/L (ref 0–18)
BASOPHILS # BLD AUTO: 0.03 X10(3) UL (ref 0–0.2)
BASOPHILS NFR BLD AUTO: 0.4 %
BUN SERPL-MCNC: 15 MG/DL (ref 8–20)
BUN/CREAT SERPL: 9.1 (ref 10–20)
CALCIUM SERPL-MCNC: 9.7 MG/DL (ref 8.5–10.5)
CHLORIDE SERPL-SCNC: 105 MMOL/L (ref 95–110)
CO2 SERPL-SCNC: 23 MMOL/L (ref 22–32)
CREAT SERPL-MCNC: 1.64 MG/DL (ref 0.5–1.5)
DEPRECATED RDW RBC AUTO: 42.2 FL (ref 35.1–46.3)
EOSINOPHIL # BLD AUTO: 0.25 X10(3) UL (ref 0–0.7)
EOSINOPHIL NFR BLD AUTO: 3.3 %
ERYTHROCYTE [DISTWIDTH] IN BLOOD BY AUTOMATED COUNT: 12.8 % (ref 11–15)
GLUCOSE SERPL-MCNC: 120 MG/DL (ref 70–99)
HCT VFR BLD AUTO: 44.9 % (ref 35–48)
HGB BLD-MCNC: 14.2 G/DL (ref 12–16)
IMM GRANULOCYTES # BLD AUTO: 0.01 X10(3) UL (ref 0–1)
IMM GRANULOCYTES NFR BLD: 0.1 %
LYMPHOCYTES # BLD AUTO: 2.73 X10(3) UL (ref 1–4)
LYMPHOCYTES NFR BLD AUTO: 36.2 %
MCH RBC QN AUTO: 28.6 PG (ref 26–34)
MCHC RBC AUTO-ENTMCNC: 31.6 G/DL (ref 31–37)
MCV RBC AUTO: 90.3 FL (ref 80–100)
MONOCYTES # BLD AUTO: 0.6 X10(3) UL (ref 0.1–1)
MONOCYTES NFR BLD AUTO: 7.9 %
NEUTROPHILS # BLD AUTO: 3.93 X10 (3) UL (ref 1.5–7.7)
NEUTROPHILS # BLD AUTO: 3.93 X10(3) UL (ref 1.5–7.7)
NEUTROPHILS NFR BLD AUTO: 52.1 %
OSMOLALITY UR CALC.SUM OF ELEC: 290 MOSM/KG (ref 275–295)
PLATELET # BLD AUTO: 265 10(3)UL (ref 150–450)
POTASSIUM SERPL-SCNC: 4.2 MMOL/L (ref 3.3–5.1)
RBC # BLD AUTO: 4.97 X10(6)UL (ref 3.8–5.3)
SODIUM SERPL-SCNC: 139 MMOL/L (ref 136–144)
TROPONIN I SERPL-MCNC: 0 NG/ML (ref ?–0.03)
TROPONIN I SERPL-MCNC: 0 NG/ML (ref ?–0.03)
WBC # BLD AUTO: 7.6 X10(3) UL (ref 4–11)

## 2019-02-04 PROCEDURE — 71046 X-RAY EXAM CHEST 2 VIEWS: CPT

## 2019-02-04 PROCEDURE — 93010 ELECTROCARDIOGRAM REPORT: CPT | Performed by: INTERNAL MEDICINE

## 2019-02-04 PROCEDURE — 99285 EMERGENCY DEPT VISIT HI MDM: CPT

## 2019-02-04 PROCEDURE — 85025 COMPLETE CBC W/AUTO DIFF WBC: CPT

## 2019-02-04 PROCEDURE — 84484 ASSAY OF TROPONIN QUANT: CPT | Performed by: EMERGENCY MEDICINE

## 2019-02-04 PROCEDURE — 80048 BASIC METABOLIC PNL TOTAL CA: CPT

## 2019-02-04 PROCEDURE — 93005 ELECTROCARDIOGRAM TRACING: CPT

## 2019-02-04 PROCEDURE — 93010 ELECTROCARDIOGRAM REPORT: CPT | Performed by: EMERGENCY MEDICINE

## 2019-02-04 PROCEDURE — 84484 ASSAY OF TROPONIN QUANT: CPT

## 2019-02-04 NOTE — ED INITIAL ASSESSMENT (HPI)
Patient complains of chest pressure x2-3 days, states she started having chest pain today, took 4 baby aspirin and an alprazolam today, admits to worrying about \"everything\"

## 2019-02-04 NOTE — ED PROVIDER NOTES
Patient Seen in: [facility] AND [facility] Emergency Department    History   Patient presents with:  Chest Pain Angina (cardiovascular)    Stated Complaint: chest pain    HPI    78-year-old female with history of CAD status post CABG 3/30/2018 presents for evalu SUR[facility], [facility]           Social History    Tobacco Use      Smoking status: Former Smoker        Packs/day: 1.50        Years: 30.00        Pack years: 39        Quit date: 2006        Years since quittin.7      Smokeless tobacco: Never Used Glucose 120 (*)     Creatinine 1.64 (*)     BUN/CREA Ratio 9.1 (*)     GFR, Non- 31 (*)     GFR, -American 36 (*)     All other components within normal limits   TROPONIN I - Normal   TROPONIN I - Normal   CBC WITH DIFFERENTIAL WITH osteoarthritic changes are seen in the spine and both     shoulders. OTHER: Surgical clips are seen from prior cholecystectomy.  Surgical clips     are seen in the right axilla.                           =====    CONCLUSION:     1. Stable findings from A of native heart without angina pectoris; and Chronic systolic CHF (congestive heart failure) ([address] on their problem list. to contribute to the complexity of this ED evaluation.     Monitor Interpretation: normal sinus rhythm with a rate of Normal    Oxygen diagnosis)    Disposition:  Discharge  2/4/2019  2:40 pm    Follow-up:  Kg Chen MD  [address] [place] [address] [address]    Schedule an appointment as soon as possible for a visit in 3 days  For follow up    We recom

## 2019-02-04 NOTE — CONSULTS
[facility]    PATIENT'S NAME: Derek García   ATTENDING PHYSICIAN: Elysia Soares MD   CONSULTING PHYSICIAN: [place] L. Joanne Litten, MD   PATIENT ACCOUNT#:   [id number]    LOCATION:  [facility] [address] [facility]  MEDICAL RECORD #:   [id number]       DATE OF There is no shortness of breath or cough. She denies any abdominal pain, melena, or hematuria. There are no new allergies. No new rashes. There is no limiting arthritis. There is no history of stroke. There is no history of diabetes.       PHYSICAL EX

## 2019-03-12 PROBLEM — N17.9 ACUTE KIDNEY INJURY (HCC): Status: RESOLVED | Noted: 2018-03-29 | Resolved: 2019-03-12

## 2019-03-12 PROBLEM — D68.9 COAGULATION DEFECT (HCC): Status: ACTIVE | Noted: 2019-03-12

## 2019-03-12 PROBLEM — J96.00 ACUTE RESPIRATORY FAILURE, UNSPECIFIED WHETHER WITH HYPOXIA OR HYPERCAPNIA (HCC): Status: RESOLVED | Noted: 2019-03-12 | Resolved: 2019-03-12

## 2019-03-12 PROBLEM — I25.119 CORONARY ARTERY DISEASE INVOLVING NATIVE HEART WITH ANGINA PECTORIS, UNSPECIFIED VESSEL OR LESION TYPE (HCC): Status: ACTIVE | Noted: 2019-03-12

## 2019-03-12 PROBLEM — D68.9 COAGULATION DEFECT (HCC): Status: RESOLVED | Noted: 2019-03-12 | Resolved: 2019-03-12

## 2019-03-12 PROBLEM — I25.119 CORONARY ARTERY DISEASE INVOLVING NATIVE HEART WITH ANGINA PECTORIS, UNSPECIFIED VESSEL OR LESION TYPE: Status: ACTIVE | Noted: 2019-03-12

## 2019-03-12 PROBLEM — J96.00 ACUTE RESPIRATORY FAILURE, UNSPECIFIED WHETHER WITH HYPOXIA OR HYPERCAPNIA (HCC): Status: ACTIVE | Noted: 2019-03-12

## 2019-03-12 PROBLEM — N17.9 ACUTE KIDNEY INJURY: Status: RESOLVED | Noted: 2018-03-29 | Resolved: 2019-03-12

## 2019-09-10 PROCEDURE — 86003 ALLG SPEC IGE CRUDE XTRC EA: CPT | Performed by: INTERNAL MEDICINE

## 2019-09-10 PROCEDURE — 87086 URINE CULTURE/COLONY COUNT: CPT | Performed by: INTERNAL MEDICINE

## 2019-09-18 PROCEDURE — 87077 CULTURE AEROBIC IDENTIFY: CPT | Performed by: OTOLARYNGOLOGY

## 2019-09-18 PROCEDURE — 87205 SMEAR GRAM STAIN: CPT | Performed by: OTOLARYNGOLOGY

## 2019-09-18 PROCEDURE — 87186 SC STD MICRODIL/AGAR DIL: CPT | Performed by: OTOLARYNGOLOGY

## 2019-09-18 PROCEDURE — 87102 FUNGUS ISOLATION CULTURE: CPT | Performed by: OTOLARYNGOLOGY

## 2019-09-18 PROCEDURE — 88108 CYTOPATH CONCENTRATE TECH: CPT | Performed by: UROLOGY

## 2019-09-18 PROCEDURE — 87070 CULTURE OTHR SPECIMN AEROBIC: CPT | Performed by: OTOLARYNGOLOGY

## 2020-03-19 PROBLEM — I77.1 TORTUOUS AORTA (HCC): Status: ACTIVE | Noted: 2020-03-19

## 2020-03-19 PROBLEM — I77.1 TORTUOUS AORTA: Status: ACTIVE | Noted: 2020-03-19

## 2020-11-03 PROBLEM — I73.9 SMALL VESSEL DISEASE (HCC): Status: ACTIVE | Noted: 2020-11-03

## 2020-11-03 PROBLEM — F13.20 SEDATIVE, HYPNOTIC OR ANXIOLYTIC DEPENDENCE, UNCOMPLICATED (HCC): Status: ACTIVE | Noted: 2020-11-03

## 2020-11-03 PROBLEM — I73.9 SMALL VESSEL DISEASE: Status: ACTIVE | Noted: 2020-11-03

## 2020-11-03 PROBLEM — J47.9 BRONCHIECTASIS WITHOUT COMPLICATION (HCC): Status: ACTIVE | Noted: 2020-11-03

## 2021-12-17 ENCOUNTER — APPOINTMENT (OUTPATIENT)
Dept: CT IMAGING | Facility: HOSPITAL | Age: 71
End: 2021-12-17
Attending: EMERGENCY MEDICINE
Payer: MEDICARE

## 2021-12-17 ENCOUNTER — HOSPITAL ENCOUNTER (EMERGENCY)
Facility: HOSPITAL | Age: 71
Discharge: HOME | End: 2021-12-17
Attending: EMERGENCY MEDICINE
Payer: MEDICARE

## 2021-12-17 VITALS
WEIGHT: 130 LBS | BODY MASS INDEX: 22.2 KG/M2 | OXYGEN SATURATION: 97 % | HEIGHT: 64 IN | RESPIRATION RATE: 18 BRPM | SYSTOLIC BLOOD PRESSURE: 171 MMHG | HEART RATE: 66 BPM | DIASTOLIC BLOOD PRESSURE: 81 MMHG | TEMPERATURE: 98 F

## 2021-12-17 DIAGNOSIS — N28.9 RENAL INSUFFICIENCY: ICD-10-CM

## 2021-12-17 DIAGNOSIS — K57.90 DIVERTICULOSIS: Primary | ICD-10-CM

## 2021-12-17 PROCEDURE — 80048 BASIC METABOLIC PNL TOTAL CA: CPT | Performed by: EMERGENCY MEDICINE

## 2021-12-17 PROCEDURE — 74176 CT ABD & PELVIS W/O CONTRAST: CPT | Performed by: EMERGENCY MEDICINE

## 2021-12-17 PROCEDURE — 85025 COMPLETE CBC W/AUTO DIFF WBC: CPT | Performed by: EMERGENCY MEDICINE

## 2021-12-17 PROCEDURE — 96360 HYDRATION IV INFUSION INIT: CPT

## 2021-12-17 PROCEDURE — 99284 EMERGENCY DEPT VISIT MOD MDM: CPT

## 2021-12-17 NOTE — ED PROVIDER NOTES
Patient Seen in: [facility] Emergency Department      History   Patient presents with:  Bleeding    Stated Complaint:     Subjective:   HPI    67 y/o female w/ lower abd discomfort and BRB this am for several episodes. No fever. No N/V.   Remote negative except as noted above.     Physical Exam     ED Triage Vitals [12/17/21 1242]   /81   Pulse 68   Resp 18   Temp 97.5 °F (36.4 °C)   Temp src Axillary   SpO2 99 %   O2 Device None (Room air)       Current:/62   Pulse 61   Temp 97.5 °F (3 Abnormality         Status                     ---------                               -----------         ------                     CBC W/ DIFFERENTIAL[[id number]]          Abnormal            Final result                 Please view results for these tra enlargement. KIDNEYS:   Worsening moderate renal atrophy is seen bilaterally. There is stable mild perinephric scarring bilaterally. No urinary tract stones or hydronephrosis.   There has been development of a 1.1 cm circumscribed hypodense left renal co without IV contrast but probably represents a small cyst. 4. Stable postoperative changes from a previous hysterectomy. There is a stable 3.2 cm right ovarian dermoid. 5. No biliary ductal dilatation in this post cholecystectomy patient.  6. Lesser inciden

## 2021-12-17 NOTE — ED INITIAL ASSESSMENT (HPI)
Patient arrived via EMS for lower abdominal pain that began this morning after she had a normal bm. Began having cramping and noted liquid stool with bright red blood.

## 2021-12-30 PROBLEM — N18.4 CHRONIC RENAL DISEASE, STAGE IV (HCC): Status: ACTIVE | Noted: 2021-12-30

## 2022-03-30 PROBLEM — K62.5 RECTAL BLEEDING: Status: ACTIVE | Noted: 2022-03-30

## 2022-04-05 PROBLEM — N18.32 STAGE 3B CHRONIC KIDNEY DISEASE (HCC): Status: ACTIVE | Noted: 2017-01-31

## 2022-04-05 PROBLEM — E46 PROTEIN-CALORIE MALNUTRITION, UNSPECIFIED SEVERITY (HCC): Status: ACTIVE | Noted: 2022-04-05

## 2022-09-11 ENCOUNTER — APPOINTMENT (OUTPATIENT)
Dept: GENERAL RADIOLOGY | Facility: HOSPITAL | Age: 72
End: 2022-09-11
Attending: EMERGENCY MEDICINE
Payer: MEDICARE

## 2022-09-11 ENCOUNTER — HOSPITAL ENCOUNTER (EMERGENCY)
Facility: HOSPITAL | Age: 72
Discharge: HOME | End: 2022-09-11
Attending: EMERGENCY MEDICINE
Payer: MEDICARE

## 2022-09-11 VITALS
WEIGHT: 122 LBS | DIASTOLIC BLOOD PRESSURE: 68 MMHG | TEMPERATURE: 98 F | HEIGHT: 64 IN | OXYGEN SATURATION: 98 % | RESPIRATION RATE: 16 BRPM | SYSTOLIC BLOOD PRESSURE: 158 MMHG | BODY MASS INDEX: 20.83 KG/M2 | HEART RATE: 59 BPM

## 2022-09-11 DIAGNOSIS — R07.89 CHEST DISCOMFORT: Primary | ICD-10-CM

## 2022-09-11 LAB
ANION GAP SERPL CALC-SCNC: 8 MMOL/L (ref 0–18)
BASOPHILS # BLD AUTO: 0.03 X10(3) UL (ref 0–0.2)
BASOPHILS NFR BLD AUTO: 0.3 %
BUN BLD-MCNC: 24 MG/DL (ref 7–18)
BUN/CREAT SERPL: 13.9 (ref 10–20)
CALCIUM BLD-MCNC: 8.7 MG/DL (ref 8.5–10.1)
CHLORIDE SERPL-SCNC: 98 MMOL/L (ref 98–112)
CO2 SERPL-SCNC: 27 MMOL/L (ref 21–32)
CREAT BLD-MCNC: 1.73 MG/DL
D DIMER PPP FEU-MCNC: 0.45 UG/ML FEU (ref ?–0.74)
DEPRECATED RDW RBC AUTO: 39.1 FL (ref 35.1–46.3)
EOSINOPHIL # BLD AUTO: 0.04 X10(3) UL (ref 0–0.7)
EOSINOPHIL NFR BLD AUTO: 0.3 %
ERYTHROCYTE [DISTWIDTH] IN BLOOD BY AUTOMATED COUNT: 12.2 % (ref 11–15)
GFR SERPLBLD BASED ON 1.73 SQ M-ARVRAT: 31 ML/MIN/1.73M2 (ref 60–?)
GLUCOSE BLD-MCNC: 92 MG/DL (ref 70–99)
HCT VFR BLD AUTO: 43.6 %
HGB BLD-MCNC: 14 G/DL
IMM GRANULOCYTES # BLD AUTO: 0.06 X10(3) UL (ref 0–1)
IMM GRANULOCYTES NFR BLD: 0.5 %
LYMPHOCYTES # BLD AUTO: 4.34 X10(3) UL (ref 1–4)
LYMPHOCYTES NFR BLD AUTO: 36.4 %
MCH RBC QN AUTO: 28.1 PG (ref 26–34)
MCHC RBC AUTO-ENTMCNC: 32.1 G/DL (ref 31–37)
MCV RBC AUTO: 87.6 FL
MONOCYTES # BLD AUTO: 1.22 X10(3) UL (ref 0.1–1)
MONOCYTES NFR BLD AUTO: 10.2 %
NEUTROPHILS # BLD AUTO: 6.23 X10 (3) UL (ref 1.5–7.7)
NEUTROPHILS # BLD AUTO: 6.23 X10(3) UL (ref 1.5–7.7)
NEUTROPHILS NFR BLD AUTO: 52.3 %
OSMOLALITY SERPL CALC.SUM OF ELEC: 280 MOSM/KG (ref 275–295)
PLATELET # BLD AUTO: 384 10(3)UL (ref 150–450)
POTASSIUM SERPL-SCNC: 3.8 MMOL/L (ref 3.5–5.1)
RBC # BLD AUTO: 4.98 X10(6)UL
SODIUM SERPL-SCNC: 133 MMOL/L (ref 136–145)
TROPONIN I HIGH SENSITIVITY: 7 NG/L
TROPONIN I HIGH SENSITIVITY: 7 NG/L
WBC # BLD AUTO: 11.9 X10(3) UL (ref 4–11)

## 2022-09-11 PROCEDURE — 80048 BASIC METABOLIC PNL TOTAL CA: CPT | Performed by: EMERGENCY MEDICINE

## 2022-09-11 PROCEDURE — 85379 FIBRIN DEGRADATION QUANT: CPT | Performed by: EMERGENCY MEDICINE

## 2022-09-11 PROCEDURE — 85025 COMPLETE CBC W/AUTO DIFF WBC: CPT | Performed by: EMERGENCY MEDICINE

## 2022-09-11 PROCEDURE — 71045 X-RAY EXAM CHEST 1 VIEW: CPT | Performed by: EMERGENCY MEDICINE

## 2022-09-11 PROCEDURE — 36415 COLL VENOUS BLD VENIPUNCTURE: CPT

## 2022-09-11 PROCEDURE — 99284 EMERGENCY DEPT VISIT MOD MDM: CPT

## 2022-09-11 PROCEDURE — 93005 ELECTROCARDIOGRAM TRACING: CPT

## 2022-09-11 PROCEDURE — 93010 ELECTROCARDIOGRAM REPORT: CPT | Performed by: EMERGENCY MEDICINE

## 2022-09-11 PROCEDURE — 84484 ASSAY OF TROPONIN QUANT: CPT | Performed by: EMERGENCY MEDICINE

## 2022-09-11 RX ORDER — LORAZEPAM 1 MG/1
1 TABLET ORAL ONCE
Status: COMPLETED | OUTPATIENT
Start: 2022-09-11 | End: 2022-09-11

## 2022-09-11 NOTE — ED INITIAL ASSESSMENT (HPI)
Pt arrived to ED from home c/o intermittent hot flashes and shortness of breath while sitting at home watching TV.  Pt denies symptoms currently, pt reports she took a nitroglycerin and an alprazolam prior to arrival.

## 2023-03-06 ENCOUNTER — APPOINTMENT (OUTPATIENT)
Dept: GENERAL RADIOLOGY | Facility: HOSPITAL | Age: 73
End: 2023-03-06
Payer: MEDICARE

## 2023-03-06 ENCOUNTER — HOSPITAL ENCOUNTER (EMERGENCY)
Facility: HOSPITAL | Age: 73
Discharge: HOME | End: 2023-03-06
Attending: EMERGENCY MEDICINE
Payer: MEDICARE

## 2023-03-06 VITALS
WEIGHT: 130 LBS | HEART RATE: 50 BPM | DIASTOLIC BLOOD PRESSURE: 73 MMHG | TEMPERATURE: 98 F | SYSTOLIC BLOOD PRESSURE: 162 MMHG | RESPIRATION RATE: 19 BRPM | OXYGEN SATURATION: 99 % | BODY MASS INDEX: 22.2 KG/M2 | HEIGHT: 64 IN

## 2023-03-06 DIAGNOSIS — R00.1 SINUS BRADYCARDIA: ICD-10-CM

## 2023-03-06 DIAGNOSIS — I10 HYPERTENSION, UNSPECIFIED TYPE: Primary | ICD-10-CM

## 2023-03-06 LAB
ALBUMIN SERPL-MCNC: 3.6 G/DL (ref 3.4–5)
ALBUMIN/GLOB SERPL: 1 {RATIO} (ref 1–2)
ALP LIVER SERPL-CCNC: 102 U/L
ALT SERPL-CCNC: 12 U/L
ANION GAP SERPL CALC-SCNC: 4 MMOL/L (ref 0–18)
AST SERPL-CCNC: 14 U/L (ref 15–37)
BASOPHILS # BLD AUTO: 0.04 X10(3) UL (ref 0–0.2)
BASOPHILS NFR BLD AUTO: 0.5 %
BILIRUB SERPL-MCNC: 0.4 MG/DL (ref 0.1–2)
BUN BLD-MCNC: 15 MG/DL (ref 7–18)
BUN/CREAT SERPL: 6.9 (ref 10–20)
CALCIUM BLD-MCNC: 9.7 MG/DL (ref 8.5–10.1)
CHLORIDE SERPL-SCNC: 107 MMOL/L (ref 98–112)
CO2 SERPL-SCNC: 27 MMOL/L (ref 21–32)
CREAT BLD-MCNC: 2.18 MG/DL
DEPRECATED RDW RBC AUTO: 39.2 FL (ref 35.1–46.3)
EOSINOPHIL # BLD AUTO: 0.25 X10(3) UL (ref 0–0.7)
EOSINOPHIL NFR BLD AUTO: 3.1 %
ERYTHROCYTE [DISTWIDTH] IN BLOOD BY AUTOMATED COUNT: 12.6 % (ref 11–15)
GFR SERPLBLD BASED ON 1.73 SQ M-ARVRAT: 23 ML/MIN/1.73M2 (ref 60–?)
GLOBULIN PLAS-MCNC: 3.5 G/DL (ref 2.8–4.4)
GLUCOSE BLD-MCNC: 112 MG/DL (ref 70–99)
HCT VFR BLD AUTO: 41.5 %
HGB BLD-MCNC: 13.2 G/DL
IMM GRANULOCYTES # BLD AUTO: 0.02 X10(3) UL (ref 0–1)
IMM GRANULOCYTES NFR BLD: 0.2 %
LYMPHOCYTES # BLD AUTO: 3.28 X10(3) UL (ref 1–4)
LYMPHOCYTES NFR BLD AUTO: 40.6 %
MCH RBC QN AUTO: 27.3 PG (ref 26–34)
MCHC RBC AUTO-ENTMCNC: 31.8 G/DL (ref 31–37)
MCV RBC AUTO: 85.7 FL
MONOCYTES # BLD AUTO: 0.63 X10(3) UL (ref 0.1–1)
MONOCYTES NFR BLD AUTO: 7.8 %
NEUTROPHILS # BLD AUTO: 3.86 X10 (3) UL (ref 1.5–7.7)
NEUTROPHILS # BLD AUTO: 3.86 X10(3) UL (ref 1.5–7.7)
NEUTROPHILS NFR BLD AUTO: 47.8 %
OSMOLALITY SERPL CALC.SUM OF ELEC: 288 MOSM/KG (ref 275–295)
PLATELET # BLD AUTO: 343 10(3)UL (ref 150–450)
POTASSIUM SERPL-SCNC: 4.9 MMOL/L (ref 3.5–5.1)
PROT SERPL-MCNC: 7.1 G/DL (ref 6.4–8.2)
RBC # BLD AUTO: 4.84 X10(6)UL
SODIUM SERPL-SCNC: 138 MMOL/L (ref 136–145)
TROPONIN I HIGH SENSITIVITY: 9 NG/L
WBC # BLD AUTO: 8.1 X10(3) UL (ref 4–11)

## 2023-03-06 PROCEDURE — 93005 ELECTROCARDIOGRAM TRACING: CPT

## 2023-03-06 PROCEDURE — 80053 COMPREHEN METABOLIC PANEL: CPT | Performed by: EMERGENCY MEDICINE

## 2023-03-06 PROCEDURE — 80053 COMPREHEN METABOLIC PANEL: CPT

## 2023-03-06 PROCEDURE — 99285 EMERGENCY DEPT VISIT HI MDM: CPT

## 2023-03-06 PROCEDURE — 93010 ELECTROCARDIOGRAM REPORT: CPT

## 2023-03-06 PROCEDURE — 84484 ASSAY OF TROPONIN QUANT: CPT

## 2023-03-06 PROCEDURE — 85025 COMPLETE CBC W/AUTO DIFF WBC: CPT

## 2023-03-06 PROCEDURE — 99284 EMERGENCY DEPT VISIT MOD MDM: CPT

## 2023-03-06 PROCEDURE — 71045 X-RAY EXAM CHEST 1 VIEW: CPT

## 2023-03-06 PROCEDURE — 36415 COLL VENOUS BLD VENIPUNCTURE: CPT

## 2023-03-06 PROCEDURE — 84484 ASSAY OF TROPONIN QUANT: CPT | Performed by: EMERGENCY MEDICINE

## 2023-03-06 PROCEDURE — 85025 COMPLETE CBC W/AUTO DIFF WBC: CPT | Performed by: EMERGENCY MEDICINE

## 2023-03-06 RX ORDER — AMLODIPINE BESYLATE 5 MG/1
5 TABLET ORAL DAILY
Qty: 14 TABLET | Refills: 0 | Status: SHIPPED | OUTPATIENT
Start: 2023-03-06 | End: 2023-03-20

## 2023-03-06 RX ORDER — AMLODIPINE BESYLATE 5 MG/1
5 TABLET ORAL ONCE
Status: COMPLETED | OUTPATIENT
Start: 2023-03-06 | End: 2023-03-06

## 2023-03-06 NOTE — ED INITIAL ASSESSMENT (HPI)
Pt to ED via triage with c/o HTN. Pt reports she's taken her BP @ home multiple times today and had SBP readings >180. Pt took her xanax PTA in hopes to lower her BP as well as nitroglycerin and ASA. Pt states \"I felt it in my chest that my BP was yasir, I could just feel it\".

## 2023-03-07 LAB
ATRIAL RATE: 55 BPM
P AXIS: 54 DEGREES
P-R INTERVAL: 148 MS
Q-T INTERVAL: 450 MS
QRS DURATION: 94 MS
QTC CALCULATION (BEZET): 430 MS
R AXIS: -21 DEGREES
T AXIS: 91 DEGREES
VENTRICULAR RATE: 55 BPM

## 2023-03-07 NOTE — DISCHARGE INSTRUCTIONS
Stop your carvedilol. Take the Norvasc I have prescribed daily as directed. Follow-up with your doctors and direct further questions concerning blood pressure management to Dr. Cuba Loyola in the office tomorrow.

## 2024-05-19 ENCOUNTER — HOSPITAL ENCOUNTER (EMERGENCY)
Facility: HOSPITAL | Age: 74
Discharge: HOME | End: 2024-05-19
Attending: EMERGENCY MEDICINE

## 2024-05-19 VITALS
WEIGHT: 132 LBS | OXYGEN SATURATION: 98 % | SYSTOLIC BLOOD PRESSURE: 144 MMHG | DIASTOLIC BLOOD PRESSURE: 63 MMHG | BODY MASS INDEX: 23.39 KG/M2 | TEMPERATURE: 98 F | HEART RATE: 69 BPM | RESPIRATION RATE: 12 BRPM | HEIGHT: 63 IN

## 2024-05-19 DIAGNOSIS — K52.9 GASTROENTERITIS: Primary | ICD-10-CM

## 2024-05-19 LAB
ALBUMIN SERPL-MCNC: 4.9 G/DL (ref 3.2–4.8)
ALP LIVER SERPL-CCNC: 100 U/L
ALT SERPL-CCNC: <7 U/L
ANION GAP SERPL CALC-SCNC: 7 MMOL/L (ref 0–18)
AST SERPL-CCNC: 18 U/L (ref ?–34)
BASOPHILS # BLD AUTO: 0.02 X10(3) UL (ref 0–0.2)
BASOPHILS NFR BLD AUTO: 0.3 %
BILIRUB DIRECT SERPL-MCNC: 0.2 MG/DL (ref ?–0.3)
BILIRUB SERPL-MCNC: 0.5 MG/DL (ref 0.2–1.1)
BUN BLD-MCNC: 18 MG/DL (ref 9–23)
BUN/CREAT SERPL: 12.5 (ref 10–20)
CALCIUM BLD-MCNC: 9.9 MG/DL (ref 8.7–10.4)
CHLORIDE SERPL-SCNC: 108 MMOL/L (ref 98–112)
CO2 SERPL-SCNC: 26 MMOL/L (ref 21–32)
CREAT BLD-MCNC: 1.44 MG/DL
DEPRECATED RDW RBC AUTO: 41.1 FL (ref 35.1–46.3)
EGFRCR SERPLBLD CKD-EPI 2021: 38 ML/MIN/1.73M2 (ref 60–?)
EOSINOPHIL # BLD AUTO: 0.23 X10(3) UL (ref 0–0.7)
EOSINOPHIL NFR BLD AUTO: 3 %
ERYTHROCYTE [DISTWIDTH] IN BLOOD BY AUTOMATED COUNT: 13.8 % (ref 11–15)
GLUCOSE BLD-MCNC: 103 MG/DL (ref 70–99)
HCT VFR BLD AUTO: 44 %
HGB BLD-MCNC: 13.9 G/DL
IMM GRANULOCYTES # BLD AUTO: 0.02 X10(3) UL (ref 0–1)
IMM GRANULOCYTES NFR BLD: 0.3 %
LYMPHOCYTES # BLD AUTO: 2.69 X10(3) UL (ref 1–4)
LYMPHOCYTES NFR BLD AUTO: 35.2 %
MCH RBC QN AUTO: 26 PG (ref 26–34)
MCHC RBC AUTO-ENTMCNC: 31.6 G/DL (ref 31–37)
MCV RBC AUTO: 82.2 FL
MONOCYTES # BLD AUTO: 0.45 X10(3) UL (ref 0.1–1)
MONOCYTES NFR BLD AUTO: 5.9 %
NEUTROPHILS # BLD AUTO: 4.23 X10 (3) UL (ref 1.5–7.7)
NEUTROPHILS # BLD AUTO: 4.23 X10(3) UL (ref 1.5–7.7)
NEUTROPHILS NFR BLD AUTO: 55.3 %
OSMOLALITY SERPL CALC.SUM OF ELEC: 294 MOSM/KG (ref 275–295)
PLATELET # BLD AUTO: 355 10(3)UL (ref 150–450)
POTASSIUM SERPL-SCNC: 3.6 MMOL/L (ref 3.5–5.1)
PROT SERPL-MCNC: 7.9 G/DL (ref 5.7–8.2)
RBC # BLD AUTO: 5.35 X10(6)UL
SODIUM SERPL-SCNC: 141 MMOL/L (ref 136–145)
WBC # BLD AUTO: 7.6 X10(3) UL (ref 4–11)

## 2024-05-19 PROCEDURE — 99284 EMERGENCY DEPT VISIT MOD MDM: CPT

## 2024-05-19 PROCEDURE — 85025 COMPLETE CBC W/AUTO DIFF WBC: CPT | Performed by: EMERGENCY MEDICINE

## 2024-05-19 PROCEDURE — 96360 HYDRATION IV INFUSION INIT: CPT

## 2024-05-19 PROCEDURE — 80048 BASIC METABOLIC PNL TOTAL CA: CPT | Performed by: EMERGENCY MEDICINE

## 2024-05-19 PROCEDURE — 80076 HEPATIC FUNCTION PANEL: CPT | Performed by: EMERGENCY MEDICINE

## 2024-05-19 RX ORDER — ALPRAZOLAM 0.25 MG/1
0.25 TABLET ORAL ONCE
Status: COMPLETED | OUTPATIENT
Start: 2024-05-19 | End: 2024-05-19

## 2024-05-19 NOTE — ED INITIAL ASSESSMENT (HPI)
Arrives via EMS d/t N/V/D x 1 day. Pt states she's had loss of appetite since symptom onset. Currently concerned w/ fatigue and dehydration. Admits to additionally feeling anxious.

## 2024-05-19 NOTE — CM/SW NOTE
0537:  ROMY Naik RN called requesting this ERCM to arrange lyft for patient as she is \"currently facing financial challenges.\" Also per ROMY Naik RN patient has no one to come pick her up. Per ROMY Naik RN he confirmed address on face sheet is correct and address patient would like to be discharged to. Also per ROMY Naik RN patient is able to safely ambulate independently. Per Gumaro patient will be ready for discharge in approx 10min. This ERCM informed ROMY Naik RN to please discharge patient to [facility] when ready for discharge and call this ERCM once patient in [facility]. This ERCM also informed ROMY Naik RN to please notify [facility] ER Triage staff who patient is and this ERCM will call them with lyft ETA and  details.    0557:  Lyft arranged - ETA 3MIN.    0558:  PONCE RodriguezT informed of Lyft ETA and  details and to please be sure patient enters correct lyft upon it's arrival. PONCE RodriguezT v/u.

## 2024-05-19 NOTE — ED PROVIDER NOTES
Patient Seen in: [facility] Emergency Department    History   No chief complaint on file.      HPI    74-year-old female with past medical history significant for anxiety, CAD, GERD, high blood pressure, high cholesterol, fibromyalgia, presents to the emergency department for evaluation possible dehydration.  Patient states that she had vomiting and diarrhea starting the day before yesterday.  She states yesterday she had no vomiting and was able to eat and drink however she is fearful that she is having dehydration.  She did not want to drink any fluids in the evening because she keeps having to go to the bathroom every time she tries to eat or drink anything and she wanted to sleep.  She woke up and was feeling some lightheaded sensation.  Patient denies any abdominal pain, fevers, chest pain, palpitations.    History from Independent Source:       External Records Reviewed:     History reviewed.   Past Medical History:    Anxiety    Atherosclerosis of aorta (HCC)    Breast cancer (HCC)    right    CANCER    Dyslipidemia    Esophageal reflux    Essential hypertension, hypertension with unspecified goal    Exposure to medical diagnostic radiation    Fibromyalgia    High blood pressure    High cholesterol    History of breast cancer    Hypertension    Refusal of statin medication by patient       History reviewed.   Past Surgical History:   Procedure Laterality Date    Chemotherapy  2003    Cholecystectomy      Colonoscopy  2015     in hospital    Colonoscopy      Colonoscopy N/A 3/30/2022    Procedure: COLONOSCOPY;  Surgeon: Herb Ryan MD;  [place] [facility] [place]    Excisional biopsy left  1980's    Excisional biopsy left  1980's    same scar as first time    Hx breast cancer      Hysterectomy      Lumpectomy right  2003    Needle biopsy left      Radiation right  2003    Remv cataract extracap,insert lens Left 1/27/2016    Procedure: LEFT PHACOEMULSIFICATION OF CATARACT WITH INTRAOCULAR LENS  IMPLANT [id number];  Surgeon: Turner Boudreaux MD;  Location: [facility]    Remv cataract extracap,insert lens Right 2/10/2016    Procedure: RIGHT PHACOEMULSIFICATION OF CATARACT WITH INTRAOCULAR LENS IMPLANT [id number];  Surgeon: Turner Boudreaux MD;  Location: [facility]         Medications :  (Not in a hospital admission)       Family History   Problem Relation Age of Onset    Heart Disorder Father     Hypertension Father     Heart Disorder Mother     Hypertension Mother     Heart Disorder Daughter     Breast Cancer Maternal Aunt         Age at dx ?    Breast Cancer Maternal Aunt         Age at dx ?    Lung Disorder Neg        Smoking Status:   Social History     Socioeconomic History    Marital status: Single   Tobacco Use    Smoking status: Former     Current packs/day: 0.00     Average packs/day: 1.5 packs/day for 30.0 years (45.0 ttl pk-yrs)     Types: Cigarettes     Start date: 1976     Quit date: 2006     Years since quittin.0    Smokeless tobacco: Never   Vaping Use    Vaping status: Never Used   Substance and Sexual Activity    Alcohol use: Yes     Alcohol/week: 2.0 standard drinks of alcohol     Types: 2 Standard drinks or equivalent per week    Drug use: Never    Sexual activity: Never       Constitutional and vital signs reviewed.      Social History and Family History elements reviewed from today, pertinent positives to the presenting problem noted.    Physical Exam     ED Triage Vitals [24 0403]   /63   Pulse 67   Resp 15   Temp 98.1 °F (36.7 °C)   Temp src Oral   SpO2 99 %   O2 Device None (Room air)       Physical Exam   Constitutional: AAOx3, well nourished, NAD  HEENT: Normocephalic, PERRLA, MMM  CV: s1s2+, RRR, no m/r/g, normal distal pulses  Pulmonary/Chest: CTA b/l with no rales, wheezes.  No chest wall tenderness  Abdominal: Nontender.  Nondistended. Soft. Bowel sounds are normal.   Neck/Back:   :   Musculoskeletal: Normal range of motion. No  deformity.   Neurological: Awake, alert. Normal reflexes. No cranial nerve deficit.    Skin: Skin is warm and dry. No rash noted. No erythema.   Psychiatric:      All measures to prevent infection transmission during my interaction with the patient were taken. The patient was already wearing a droplet mask on my arrival to the room. Personal protective equipment was worn throughout the duration of the exam.      ED Course        Labs Reviewed   BASIC METABOLIC PANEL (8) - Abnormal; Notable for the following components:       Result Value    Glucose 103 (*)     Creatinine 1.44 (*)     eGFR-Cr 38 (*)     All other components within normal limits   HEPATIC FUNCTION PANEL (7) - Abnormal; Notable for the following components:    ALT <7 (*)     Albumin 4.9 (*)     All other components within normal limits   CBC W/ DIFFERENTIAL - Abnormal; Notable for the following components:    RBC 5.35 (*)     All other components within normal limits   CBC WITH DIFFERENTIAL WITH PLATELET    Narrative:     The following orders were created for panel order CBC With Differential With Platelet.                  Procedure                               Abnormality         Status                                     ---------                               -----------         ------                                     CBC W/ DIFFERENTIAL[[id number]]          Abnormal            Final result                                                 Please view results for these tests on the individual orders.     My Independent Interpretation of EKG (if performed):     Monitor Interpretation:   normal sinus rhythm as interpreted by me.      Imaging Results Available and Reviewed while in ED: No results found.  ED Medications Administered:   Medications   sodium chloride 0.9 % IV bolus 1,000 mL (0 mL Intravenous Stopped 5/19/24 0507)   ALPRAZolam (Xanax) tab 0.25 mg (0.25 mg Oral Given 5/19/24 0455)             MDM     Vitals:    05/19/24 0403 05/19/24 0500    BP: 144/63    Pulse: 67 69   Resp: 15 12   Temp: 98.1 °F (36.7 °C)    TempSrc: Oral    SpO2: 99% 98%   Weight: 59.9 kg    Height: 160 cm (5' 3\")      *I personally reviewed and interpreted all ED vitals.    Independent Interpretation of Studies:     Social Determinants of Health:     Procedures:      Differential/MDM/Shared Decision Making: Differential Diagnosis includes dehydration, electrolyte abnormality, MICHELLE, gastroenteritis, food poisoning, others.      The patient already has anxiety, CAD, high blood pressure, high cholesterol, fibromyalgia, to contribute to the complexity of this ED evaluation.           Workup in the ER is unremarkable.  Patient requested anxiety medicine as she is feeling afraid and states that she typically takes alprazolam and lorazepam.  Patient given 0.25 of alprazolam in the ED.  Discussed case with patient and she understands all of her lab work and vital signs are normal.  She has urinated in the ED.  Will discharge at this time.      Condition upon leaving the department: Stable    Disposition and Plan     Clinical Impression:  1. Gastroenteritis        Disposition:  Discharge    Follow-up:  Ying Gamboa MD  [address] [facility]  [address] [place] [address] [phone]    Call in 2 day(s)        Medications Prescribed:  Current Discharge Medication List

## 2024-07-02 RX ORDER — GABAPENTIN 300 MG/1
CAPSULE ORAL
COMMUNITY
Start: 2023-09-14

## 2024-07-02 RX ORDER — AMLODIPINE BESYLATE 5 MG/1
1 TABLET ORAL DAILY
COMMUNITY
Start: 2023-09-14

## 2024-07-02 RX ORDER — HYDROXYZINE HYDROCHLORIDE 25 MG/1
1 TABLET, FILM COATED ORAL EVERY 6 HOURS PRN
COMMUNITY
Start: 2023-04-10

## 2024-07-02 RX ORDER — CALCIUM CARBONATE/VITAMIN D3 500-10/5ML
1 LIQUID (ML) ORAL DAILY
COMMUNITY
Start: 2023-09-14

## 2024-07-02 RX ORDER — OXYBUTYNIN CHLORIDE 5 MG/1
5 TABLET, EXTENDED RELEASE ORAL 3 TIMES DAILY
COMMUNITY
Start: 2024-04-16

## 2024-07-02 RX ORDER — MIRTAZAPINE 30 MG/1
1 TABLET, FILM COATED ORAL DAILY
COMMUNITY
Start: 2023-09-14

## 2024-07-02 RX ORDER — SODIUM BICARBONATE 650 MG/1
650 TABLET ORAL 2 TIMES DAILY
COMMUNITY
Start: 2024-03-08

## 2024-07-08 ENCOUNTER — ANESTHESIA (OUTPATIENT)
Dept: ENDOSCOPY | Facility: HOSPITAL | Age: 74
End: 2024-07-08
Payer: MEDICARE

## 2024-07-08 ENCOUNTER — HOSPITAL ENCOUNTER (OUTPATIENT)
Facility: HOSPITAL | Age: 74
Setting detail: HOSPITAL OUTPATIENT SURGERY
Discharge: HOME | End: 2024-07-08
Attending: INTERNAL MEDICINE | Admitting: INTERNAL MEDICINE
Payer: MEDICARE

## 2024-07-08 ENCOUNTER — ANESTHESIA EVENT (OUTPATIENT)
Dept: ENDOSCOPY | Facility: HOSPITAL | Age: 74
End: 2024-07-08
Payer: MEDICARE

## 2024-07-08 DIAGNOSIS — R13.19 ESOPHAGEAL DYSPHAGIA: ICD-10-CM

## 2024-07-08 PROCEDURE — 88312 SPECIAL STAINS GROUP 1: CPT | Performed by: INTERNAL MEDICINE

## 2024-07-08 PROCEDURE — 0D718ZZ DILATION OF UPPER ESOPHAGUS, VIA NATURAL OR ARTIFICIAL OPENING ENDOSCOPIC: ICD-10-PCS | Performed by: INTERNAL MEDICINE

## 2024-07-08 PROCEDURE — 88305 TISSUE EXAM BY PATHOLOGIST: CPT | Performed by: INTERNAL MEDICINE

## 2024-07-08 PROCEDURE — 0D738ZZ DILATION OF LOWER ESOPHAGUS, VIA NATURAL OR ARTIFICIAL OPENING ENDOSCOPIC: ICD-10-PCS | Performed by: INTERNAL MEDICINE

## 2024-07-08 PROCEDURE — 0DB38ZX EXCISION OF LOWER ESOPHAGUS, VIA NATURAL OR ARTIFICIAL OPENING ENDOSCOPIC, DIAGNOSTIC: ICD-10-PCS | Performed by: INTERNAL MEDICINE

## 2024-07-08 RX ORDER — MORPHINE SULFATE 4 MG/ML
2 INJECTION, SOLUTION INTRAMUSCULAR; INTRAVENOUS EVERY 10 MIN PRN
Status: DISCONTINUED | OUTPATIENT
Start: 2024-07-08 | End: 2024-07-08

## 2024-07-08 RX ORDER — HYDROMORPHONE HYDROCHLORIDE 1 MG/ML
0.6 INJECTION, SOLUTION INTRAMUSCULAR; INTRAVENOUS; SUBCUTANEOUS EVERY 5 MIN PRN
Status: DISCONTINUED | OUTPATIENT
Start: 2024-07-08 | End: 2024-07-08

## 2024-07-08 RX ORDER — MORPHINE SULFATE 10 MG/ML
6 INJECTION, SOLUTION INTRAMUSCULAR; INTRAVENOUS EVERY 10 MIN PRN
Status: DISCONTINUED | OUTPATIENT
Start: 2024-07-08 | End: 2024-07-08

## 2024-07-08 RX ORDER — SODIUM CHLORIDE, SODIUM LACTATE, POTASSIUM CHLORIDE, CALCIUM CHLORIDE 600; 310; 30; 20 MG/100ML; MG/100ML; MG/100ML; MG/100ML
INJECTION, SOLUTION INTRAVENOUS CONTINUOUS
Status: DISCONTINUED | OUTPATIENT
Start: 2024-07-08 | End: 2024-07-08

## 2024-07-08 RX ORDER — NALOXONE HYDROCHLORIDE 0.4 MG/ML
0.08 INJECTION, SOLUTION INTRAMUSCULAR; INTRAVENOUS; SUBCUTANEOUS AS NEEDED
Status: DISCONTINUED | OUTPATIENT
Start: 2024-07-08 | End: 2024-07-08

## 2024-07-08 RX ORDER — HYDROMORPHONE HYDROCHLORIDE 1 MG/ML
0.2 INJECTION, SOLUTION INTRAMUSCULAR; INTRAVENOUS; SUBCUTANEOUS EVERY 5 MIN PRN
Status: DISCONTINUED | OUTPATIENT
Start: 2024-07-08 | End: 2024-07-08

## 2024-07-08 RX ORDER — HYDROMORPHONE HYDROCHLORIDE 1 MG/ML
0.4 INJECTION, SOLUTION INTRAMUSCULAR; INTRAVENOUS; SUBCUTANEOUS EVERY 5 MIN PRN
Status: DISCONTINUED | OUTPATIENT
Start: 2024-07-08 | End: 2024-07-08

## 2024-07-08 RX ORDER — MIDAZOLAM HYDROCHLORIDE 1 MG/ML
INJECTION INTRAMUSCULAR; INTRAVENOUS AS NEEDED
Status: DISCONTINUED | OUTPATIENT
Start: 2024-07-08 | End: 2024-07-08 | Stop reason: SURG

## 2024-07-08 RX ORDER — MORPHINE SULFATE 4 MG/ML
4 INJECTION, SOLUTION INTRAMUSCULAR; INTRAVENOUS EVERY 10 MIN PRN
Status: DISCONTINUED | OUTPATIENT
Start: 2024-07-08 | End: 2024-07-08

## 2024-07-08 RX ADMIN — MIDAZOLAM HYDROCHLORIDE 2 MG: 1 INJECTION INTRAMUSCULAR; INTRAVENOUS at 08:58:00

## 2024-07-08 NOTE — DISCHARGE INSTRUCTIONS
Home Care Instructions for Gastroscopy with Sedation    Diet:  - Resume your regular diet as tolerated unless otherwise instructed.  - Start with light meals to minimize bloating.  - Do not drink alcohol today.    Medication:  - If you have questions about resuming your normal medications, please contact your Primary Care Physician.    Activities:  - Take it easy today. Do not return to work today.  - Do not drive today.  - Do not operate any machinery today (including kitchen equipment).    Gastroscopy:  - You may have a sore throat for 2-3 days following the exam. This is normal. Gargling with warm salt water (1/2 tsp salt to 1 glass warm water) or using throat lozenges will help.  - If you experience any sharp pain in your neck, abdomen or chest, vomiting of blood, oral temperature over 100 degrees Fahrenheit, light-headedness or dizziness, or any other problems, contact your doctor.    **If unable to reach your doctor, please go to the [facility] Emergency Room**    - Your referring physician will receive a full report of your examination.  - If you do not hear from your doctor's office within two weeks of your biopsy, please call them for your results.    You may be able to see your laboratory results in [employer] between 4 and 7 business days.  In some cases, your physician may not have viewed the results before they are released to [employer].  If you have questions regarding your results contact the physician who ordered the test/exam by phone or via [employer] by choosing \"Ask a Medical Question.\"ENDOSCOPY DISCHARGE INSTRUCTIONS    Procedure Performed:   Gastroscopy    Endoscopist: No name on file  FINDINGS:   Esophageal stricture (narrowing in esophagus) and Gastritis (inflammation of the stomach lining)    MEDICATIONS:  You may resume all other medications today    DIET:  Resume Normal Diet    BIOPSIES:  Biopsy results will be released to you as soon as they are available as is now the law. This  will not allow your physician the opportunity to go over these before they are released to you. It is not necessary to contact the office for explanation of these results. Your physician will contact you within a few business days of their release to review the findings with you    X-RAYS/LABS:   No X-rays/Labs were ordered today    ADDITIONAL RECOMMENDATIONS:        Activity for remainder of today:    REST TODAY  DO NOT drive or operate heavy machinery  DO NOT drink any alcoholic beverages  DO NOT sign any legal documents or make any important decisions    After your procedure(s):  It is not unusual to feel bloated or gassy .  Passing gas and belching is encouraged. Lying on your left side with your knees flexed may relieve the discomfort. A hot pack to the abdomen may also help.    After your gastroscopy (upper endoscopy): You may experience a slight sore throat which will subside. Throat lozenges or salt water gargle can be used.    FOLLOW-UP:  Contact the office at [phone] for follow-up appointment is needed or if you develop any of the following:    Severe abdominal pain/discomfort     Excessive bleeding                     Black tarry stool    Difficulty breathing/swallowing      Persistent nausea/vomiting  Fever above 100 degrees or chills

## 2024-07-08 NOTE — H&P
History and Physical for Endoscopic Procedure      Sol Sutton Patient Status:  Hospital Outpatient Surgery    3/25/1950 MRN [id number]   Location [facility] ENDOSCOPY LAB SUITES Attending Cody Shaver MD   Hosp Day # 0 PCP Ying Gamboa MD     Date of Consult:  2024      Reason for Consultation:  Dysphagia    History of Present Illness:  Sol Sutton is a a(n) 74 year old female here for EGD evaluation for dysphagia and GERD.  Dysphagia to solids.      History:  Past Medical History:    Anxiety    Atherosclerosis of aorta (HCC)    Breast cancer (HCC)    right    CANCER    Dyslipidemia    Esophageal reflux    Essential hypertension, hypertension with unspecified goal    Exposure to medical diagnostic radiation    Fibromyalgia    Heart attack (HCC)    High blood pressure    High cholesterol    History of breast cancer    Hypertension    Refusal of statin medication by patient    Renal disorder    Visual impairment     Past Surgical History:   Procedure Laterality Date    Chemotherapy      Cholecystectomy      Colonoscopy       in hospital    Colonoscopy      Colonoscopy N/A 2022    Procedure: COLONOSCOPY;  Surgeon: Herb Ryan MD;  Location: [facility]    Excisional biopsy left      Excisional biopsy left      same scar as first time    Hx breast cancer      Hysterectomy      Ir angiogram      Lumpectomy right      Needle biopsy left      Radiation right      Remv cataract extracap,insert lens Left 2016    Procedure: LEFT PHACOEMULSIFICATION OF CATARACT WITH INTRAOCULAR LENS IMPLANT [id number];  Surgeon: Turner Boudreaux MD;  Location: [facility]    Remv cataract extracap,insert lens Right 02/10/2016    Procedure: RIGHT PHACOEMULSIFICATION OF CATARACT WITH INTRAOCULAR LENS IMPLANT [id number];  Surgeon: Turner Boudreaux MD;  Location: [facility]    Total hip replacement Left 2023     Family History   Problem  Relation Age of Onset    Heart Disorder Father     Hypertension Father     Heart Disorder Mother     Hypertension Mother     Heart Disorder Daughter     Breast Cancer Maternal Aunt         Age at dx ?    Breast Cancer Maternal Aunt         Age at dx ?    Lung Disorder Neg       reports that she quit smoking about 18 years ago. Her smoking use included cigarettes. She started smoking about 48 years ago. She has a 45 pack-year smoking history. She has never used smokeless tobacco. She reports that she does not currently use alcohol after a past usage of about 2.0 standard drinks of alcohol per week. She reports that she does not use drugs.    Allergies:  Allergies   Allergen Reactions    Levaquin [Levofloxacin] PAIN     All joints were painful      Alc-Benzyl Alc-Sulfamethoxazole-Trimethoprim RASH and ITCHING    Bactrim [Sulfamethoxazole W/Trimethoprim]     Ibuprofen     Zetia [Ezetimibe]        Medications:    Current Facility-Administered Medications:     lactated ringers infusion, , Intravenous, Continuous    Facility-Administered Medications Ordered in Other Encounters:     propofol (DIPRIVAN) injection, , Intravenous, PRN    succinylcholine chloride (ANECTINE) injection, , Intravenous, PRN    Review of Systems:  Gastrointestinal: negative other than specified in the HPI  General: negative other than specified in the HPI  Neurological: negative other than specified in the HPI  Cardiovascular: negative other than specified in the HPI  Respiratory: negative other than specified in the HPI  Skin: negative other than specified in the HPI  Allergy: negative other than specified in the HPI  ENT: negative other than specified in the HPI.    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 stage 3b chronic kidney disease (HCC)    Stage 3b  chronic kidney disease (HCC)    Atherosclerosis of aorta (HCC)    Family history of heart disease    Acute chest pain    Generalized anxiety disorder    Major depressive disorder, single episode, moderate (HCC)    Chronic systolic CHF (congestive heart failure) (HCC)    Coronary artery disease involving native heart with angina pectoris, unspecified vessel or lesion type (HCC)    Tortuous aorta (HCC)    Sedative, hypnotic or anxiolytic dependence, uncomplicated (HCC)    Bronchiectasis without complication (HCC)    Small vessel disease (HCC)    Rectal bleeding    Protein-calorie malnutrition, unspecified severity (HCC)       Impression:   Dysphagia to solids   GERD    Plan:   EGD    Cody Shaver MD  7/8/2024  8:40 AM

## 2024-07-08 NOTE — ANESTHESIA PREPROCEDURE EVALUATION
Anesthesia PreOp Note    HPI:     Sol Sutton is a 74 year old female who presents for preoperative consultation requested by: Cody Shaver MD    Date of Surgery: 7/8/2024    Procedure(s):  ESOPHAGOGASTRODUODENOSCOPY with Dilation  Indication: Esophageal dysphagia    Relevant Problems   No relevant active problems       NPO:                         History Review:  Patient Active Problem List    Diagnosis Date Noted    Protein-calorie malnutrition, unspecified severity ([facility]) 04/05/2022    Rectal bleeding 03/30/2022    Sedative, hypnotic or anxiolytic dependence, uncomplicated ([facility]) 11/03/2020    Bronchiectasis without complication ([facility]) 11/03/2020    Small vessel disease (HCC) 11/03/2020    Tortuous aorta ([facility]) 03/19/2020    Coronary artery disease involving native heart with angina pectoris, unspecified vessel or lesion type ([facility]) 03/12/2019    Chronic systolic CHF (congestive heart failure) ([facility]) 07/10/2018    Generalized anxiety disorder     Major depressive disorder, single episode, moderate ([facility])     Acute chest pain 03/29/2018    Family history of heart disease 02/13/2018    Atherosclerosis of aorta (HCC) 01/09/2018    Stage 3b chronic kidney disease ([facility]) 01/31/2017    Hypertensive heart disease with systolic heart failure and stage 3b chronic kidney disease (HCC) 05/03/2016    History of breast cancer 03/06/2015    Dyslipidemia 03/15/2010    Anxiety 03/15/2010       Past Medical History:    Anxiety    Atherosclerosis of aorta (HCC)    Breast cancer (HCC)    right    CANCER    Dyslipidemia    Esophageal reflux    Essential hypertension, hypertension with unspecified goal    Exposure to medical diagnostic radiation    Fibromyalgia    Heart attack (HCC)    High blood pressure    High cholesterol    History of breast cancer    Hypertension    Refusal of statin medication by patient    Renal disorder    Visual impairment       Past Surgical History:   Procedure Laterality Date    Chemotherapy  2003     Cholecystectomy      Colonoscopy  2015     in hospital    Colonoscopy      Colonoscopy N/A 03/30/2022    Procedure: COLONOSCOPY;  Surgeon: Herb Ryan MD;  Location: [facility]    Excisional biopsy left  1980's    Excisional biopsy left  1980's    same scar as first time    Hx breast cancer      Hysterectomy      Ir angiogram      Lumpectomy right  2003    Needle biopsy left      Radiation right  2003    Remv cataract extracap,insert lens Left 01/27/2016    Procedure: LEFT PHACOEMULSIFICATION OF CATARACT WITH INTRAOCULAR LENS IMPLANT [id number];  Surgeon: Turner Boudreaux MD;  Location: [facility]    Remv cataract extracap,insert lens Right 02/10/2016    Procedure: RIGHT PHACOEMULSIFICATION OF CATARACT WITH INTRAOCULAR LENS IMPLANT [id number];  Surgeon: Turner Boudreaux MD;  Location: [facility]    Total hip replacement Left 09/07/2023       Medications Prior to Admission   Medication Sig Dispense Refill Last Dose    amLODIPine 5 MG Oral Tab Take 1 tablet (5 mg total) by mouth daily.       hydrOXYzine 25 MG Oral Tab Take 1 tablet (25 mg total) by mouth every 6 (six) hours as needed.       Magnesium Oxide 400 MG Oral Cap Take 1 tablet by mouth daily.       gabapentin 300 MG Oral Cap 1 capsule 3 TIMES DAILY (route: oral)       mirtazapine 30 MG Oral Tab Take 1 tablet (30 mg total) by mouth daily.       oxybutynin ER 5 MG Oral Tablet 24 Hr Take 1 tablet (5 mg total) by mouth 3 (three) times daily.       sodium bicarbonate 650 MG Oral Tab Take 1 tablet (650 mg total) by mouth 2 (two) times daily.       traMADol 50 MG Oral Tab Take 1 tablet (50 mg total) by mouth every 8 (eight) hours as needed. 90 tablet 0     LORazepam 2 MG Oral Tab Take 2 tablets (4 mg total) by mouth every evening. 60 tablet 0     Ergocalciferol (VITAMIN D OR) Take by mouth.       COLLAGEN OR Take by mouth.       Aspirin (ASPIR-LOW OR) Take by mouth.       mupirocin 2 % External Ointment Apply 1 Application topically  2 (two) times daily. 22 g 0     ROSUVASTATIN 5 MG Oral Tab TAKE 1 TABLET EVERY DAY 90 tablet 3     cyanocobalamine 500 MCG Oral Tab Take 1 tablet (500 mcg total) by mouth daily.       OMEPRAZOLE 20 MG Oral Capsule Delayed Release TAKE 1 CAPSULE EVERY DAY 90 capsule 2     Ipratropium Bromide 0.03 % Nasal Solution 2 sprays by Nasal route every 12 (twelve) hours. 1 each 3     ALPRAZolam 0.25 MG Oral Tab TAKE 1 TABLET BY MOUTH EVERY DAY AS NEEDED 90 tablet 0  at prn    LOSARTAN 100 MG Oral Tab TAKE 1 TABLET EVERY DAY 90 tablet 3     Fluocin-Hydroquinone-Tretinoin (TRI-LANETTE) 0.01-4-0.05 % External Cream Apply to the AA 3 nights weekly 30 g 0     mupirocin 2 % External Ointment Topical to affected area BID. 30 g 1     Glucosamine-Chondroit-Vit C-Mn (GLUCOSAMINE 1500 COMPLEX OR) Take 1,500 mg by mouth 2 (two) times daily.         Calcium Carbonate-Vitamin D (CALCIUM + D OR) 600mg daily       nitroGLYCERIN 0.4 MG Sublingual SL Tab Place 1 tablet (0.4 mg total) under the tongue every 5 (five) minutes as needed for Chest pain. (Patient not taking: Reported on 7/2/2024) 30 tablet 6 Not Taking    Blood Pressure Monitoring (BLOOD PRESSURE MONITOR [id number]) Does not apply Kit         Current Facility-Administered Medications Ordered in Epic   Medication Dose Route Frequency Provider Last Rate Last Admin    lactated ringers infusion   Intravenous Continuous Cody Shaver MD        propofol (DIPRIVAN) injection   Intravenous PRN Kandace Massey MD   20 mg at 03/30/18 1606    succinylcholine chloride (ANECTINE) injection   Intravenous PRN Kandace Massey MD   60 mg at 03/30/18 1606     No current [facility]-ordered outpatient medications on file.       Allergies   Allergen Reactions    Levaquin [Levofloxacin] PAIN     All joints were painful      Alc-Benzyl Alc-Sulfamethoxazole-Trimethoprim RASH and ITCHING    Bactrim [Sulfamethoxazole W/Trimethoprim]     Ibuprofen     Zetia [Ezetimibe]        Family History   Problem Relation Age of Onset     Heart Disorder Father     Hypertension Father     Heart Disorder Mother     Hypertension Mother     Heart Disorder Daughter     Breast Cancer Maternal Aunt         Age at dx ?    Breast Cancer Maternal Aunt         Age at dx ?    Lung Disorder Neg      Social History     Socioeconomic History    Marital status: Single   Tobacco Use    Smoking status: Former     Current packs/day: 0.00     Average packs/day: 1.5 packs/day for 30.0 years (45.0 ttl pk-yrs)     Types: Cigarettes     Start date: 1976     Quit date: 2006     Years since quittin.1    Smokeless tobacco: Never   Vaping Use    Vaping status: Never Used   Substance and Sexual Activity    Alcohol use: Not Currently     Alcohol/week: 2.0 standard drinks of alcohol     Types: 2 Standard drinks or equivalent per week     Comment: seldom    Drug use: Never    Sexual activity: Never       Available pre-op labs reviewed.  Lab Results   Component Value Date    WBC 7.6 2024    RBC 5.35 (H) 2024    HGB 13.9 2024    HCT 44.0 2024    MCV 82.2 2024    MCH 26.0 2024    MCHC 31.6 2024    RDW 13.8 2024    .0 2024     Lab Results   Component Value Date     2024    K 3.6 2024     2024    CO2 26.0 2024    BUN 18 2024    CREATSERUM 1.44 (H) 2024     (H) 2024    CA 9.9 2024          Vital Signs:  Body mass index is 23.17 kg/m².   height is 1.626 m (5' 4\") and weight is 61.2 kg (135 lb).   Vitals:    24 1555   Weight: 61.2 kg (135 lb)   Height: 1.626 m (5' 4\")        Anesthesia Evaluation     Patient summary reviewed    Airway   Mallampati: III  Dental - Dentition appears grossly intact     Pulmonary - normal exam   Cardiovascular   (+) hypertension, CAD, CHF    Rhythm: regular  Rate: normal    Neuro/Psych    (+)  neuromuscular disease,        GI/Hepatic/Renal    (+) GERD    Endo/Other    Abdominal  - normal exam     Other findings:  Poor dentition            Anesthesia Plan:   ASA:  3  Plan:   MAC  Informed Consent Plan and Risks Discussed With:  Patient      I have informed Sol Sutton and/or legal guardian or family member of the nature of the anesthetic plan, benefits, risks including possible dental damage if relevant, major complications, and any alternative forms of anesthetic management.   All of the patient's questions were answered to the best of my ability. The patient desires the anesthetic management as planned.  Rozina Waite MD, PhD  7/8/2024 8:42 AM  Present on Admission:  **None**

## 2024-07-08 NOTE — OPERATIVE REPORT
EGD Operative Report    Sol Sutton Patient Status:  [facility] Outpatient Surgery    3/25/1950 MRN [id number]   Location [facility] ENDOSCOPY LAB SUITES Attending Cody Shaver MD    Day #   0 PCP Ying Gamboa MD       Pre-op Diagnosis: Esophageal dysphagia     Post-Op Diagnosis:   Small proximal esophageal web status post dilation  Small distal esophageal Schatzki's ring status post dilation to 20 mm and biopsies taken  Remainder the esophageal examination was normal  Minimal gastritis otherwise normal gastric semination  Normal visualized duodenum      Procedure Performed: EGD with dilation and biopsies    Informed Consent: Informed consent for both the procedure and sedation were obtained from the patient. The potentially life-threatening complications of sedation, bleeding,  Perforation, transfusion or repeat endoscopy were reviewed along with the possible need for hospitalization, surgical management, transfusion or repeat endoscopy should one of these complications arise. The patient understands and is agreeable to proceed.  Sedation Type: MAC-Patient received sedation with monitored anesthesia provided by an anesthesiologist  Moderate Sedation Time: None.  Deep sedation provided by anesthesia.  Procedure Description: The patient was placed in the left lateral decubitus position.  A bite block was placed in the patient’s mouth.  The endoscope was inserted through the mouth and advanced under direct visualization to the 3rd portion of the duodenum and was then withdrawn to examine the duodenal bulb and gastric antrum.  The endoscope was then retroflexed to examine the angulus, GE junction, cardia, body and fundus and then withdrawn to examine the esophagus. The endoscope was then removed from the patient. The patient tolerated the procedure  well with no immediate complications and was transferred to the recovery area in stable condition.  Findings:    ESOPHAGUS: There was evidence of a small proximal esophageal web in the upper esophagus possibly causing some mild obstruction however the scope passed freely.  The remainder of the esophageal examination was normal in the body of the esophagus.  There is evidence of a small distal esophageal Schatzki's ring also possibly mild obstruction however scope passed freely.  The Z-line located at 38 cm from incisors was grossly normal.  The through-the-scope dilating balloon was then used to dilate both the distal ring as well as the upper esophageal web.  Schatzki's ring was dilated from 18 mm to 20 mm serially.  The esophageal web was dilated from 18 to 19 mm.  The post dilation esophageal examination was satisfactory.  Biopsies taken from Schatzki's ring with cold forceps.   STOMACH:  Mild gastric erythema suggestive of mild gastritis, however no other abnormalities. Retroflexed view was unremarkable.  Random biopsies taken from the stomach with cold forceps.     DUODENUM: Normal visualized duodenum    Recommendations:   Follow-up pathology results  Continue omeprazole 20 mg daily  Chew food well stressed with patient  Discharge:  The patient was given an after visit summary detailing the procedure, findings, recommendations and follow up plans.  Cody Shaver MD  7/8/2024  9:13 AM

## 2024-07-08 NOTE — ANESTHESIA POSTPROCEDURE EVALUATION
Patient: Sol Sutton    Procedure Summary       Date: 07/08/24 Room / Location: [facility] ENDOSCOPY 05 / EM ENDOSCOPY    Anesthesia Start: 0855 Anesthesia Stop: 0912    Procedure: ESOPHAGOGASTRODUODENOSCOPY with Dilation and biopsy Diagnosis:       Esophageal dysphagia      (Schatzki's ring, small esophageal marie, minimal gastritis)    Surgeons: Cody Shaver MD Anesthesiologist: Rozina Waite MD, PhD    Anesthesia Type: MAC ASA Status: 3            Anesthesia Type: No value filed.    Vitals Value Taken Time   /80 07/08/24 0912   Temp 98.0 07/08/24 0912   Pulse 74 07/08/24 0912   Resp 12 07/08/24 0912   SpO2 97 07/08/24 0912       [facility] AN Post Evaluation:   Patient Evaluated in PACU  Patient Participation: complete - patient participated  Level of Consciousness: awake and alert  Pain Score: 2  Pain Management: adequate  Airway Patency:patent  Yes    Nausea/Vomiting: none  Cardiovascular Status: acceptable  Respiratory Status: acceptable  Postoperative Hydration acceptable      Rozina Waite MD, PhD  7/8/2024 9:12 AM

## 2024-07-09 VITALS
WEIGHT: 135 LBS | SYSTOLIC BLOOD PRESSURE: 119 MMHG | HEART RATE: 69 BPM | HEIGHT: 64 IN | BODY MASS INDEX: 23.05 KG/M2 | RESPIRATION RATE: 18 BRPM | OXYGEN SATURATION: 93 % | DIASTOLIC BLOOD PRESSURE: 77 MMHG

## 2024-07-22 ENCOUNTER — APPOINTMENT (OUTPATIENT)
Dept: GENERAL RADIOLOGY | Facility: HOSPITAL | Age: 74
End: 2024-07-22
Attending: STUDENT IN AN ORGANIZED HEALTH CARE EDUCATION/TRAINING PROGRAM
Payer: MEDICARE

## 2024-07-22 ENCOUNTER — HOSPITAL ENCOUNTER (EMERGENCY)
Facility: HOSPITAL | Age: 74
Discharge: HOME | End: 2024-07-22
Attending: STUDENT IN AN ORGANIZED HEALTH CARE EDUCATION/TRAINING PROGRAM
Payer: MEDICARE

## 2024-07-22 VITALS
RESPIRATION RATE: 13 BRPM | BODY MASS INDEX: 22.53 KG/M2 | HEIGHT: 64 IN | SYSTOLIC BLOOD PRESSURE: 155 MMHG | WEIGHT: 132 LBS | DIASTOLIC BLOOD PRESSURE: 69 MMHG | OXYGEN SATURATION: 97 % | HEART RATE: 64 BPM | TEMPERATURE: 98 F

## 2024-07-22 DIAGNOSIS — R07.9 CHEST PAIN OF UNCERTAIN ETIOLOGY: Primary | ICD-10-CM

## 2024-07-22 LAB
ALBUMIN SERPL-MCNC: 4.4 G/DL (ref 3.2–4.8)
ALBUMIN/GLOB SERPL: 1.5 {RATIO} (ref 1–2)
ALP LIVER SERPL-CCNC: 73 U/L
ALT SERPL-CCNC: <7 U/L
ANION GAP SERPL CALC-SCNC: 5 MMOL/L (ref 0–18)
AST SERPL-CCNC: 14 U/L (ref ?–34)
ATRIAL RATE: 67 BPM
ATRIAL RATE: 67 BPM
BASOPHILS # BLD AUTO: 0.04 X10(3) UL (ref 0–0.2)
BASOPHILS NFR BLD AUTO: 0.5 %
BILIRUB SERPL-MCNC: 0.3 MG/DL (ref 0.2–1.1)
BUN BLD-MCNC: 14 MG/DL (ref 9–23)
BUN/CREAT SERPL: 9.4 (ref 10–20)
CALCIUM BLD-MCNC: 9.5 MG/DL (ref 8.7–10.4)
CHLORIDE SERPL-SCNC: 103 MMOL/L (ref 98–112)
CO2 SERPL-SCNC: 27 MMOL/L (ref 21–32)
CREAT BLD-MCNC: 1.49 MG/DL
DEPRECATED RDW RBC AUTO: 44.7 FL (ref 35.1–46.3)
EGFRCR SERPLBLD CKD-EPI 2021: 36 ML/MIN/1.73M2 (ref 60–?)
EOSINOPHIL # BLD AUTO: 0.26 X10(3) UL (ref 0–0.7)
EOSINOPHIL NFR BLD AUTO: 3.1 %
ERYTHROCYTE [DISTWIDTH] IN BLOOD BY AUTOMATED COUNT: 14.9 % (ref 11–15)
GLOBULIN PLAS-MCNC: 2.9 G/DL (ref 2–3.5)
GLUCOSE BLD-MCNC: 96 MG/DL (ref 70–99)
HCT VFR BLD AUTO: 39 %
HGB BLD-MCNC: 13 G/DL
IMM GRANULOCYTES # BLD AUTO: 0.02 X10(3) UL (ref 0–1)
IMM GRANULOCYTES NFR BLD: 0.2 %
LYMPHOCYTES # BLD AUTO: 3.08 X10(3) UL (ref 1–4)
LYMPHOCYTES NFR BLD AUTO: 36.6 %
MCH RBC QN AUTO: 27.2 PG (ref 26–34)
MCHC RBC AUTO-ENTMCNC: 33.3 G/DL (ref 31–37)
MCV RBC AUTO: 81.6 FL
MONOCYTES # BLD AUTO: 0.64 X10(3) UL (ref 0.1–1)
MONOCYTES NFR BLD AUTO: 7.6 %
NEUTROPHILS # BLD AUTO: 4.37 X10 (3) UL (ref 1.5–7.7)
NEUTROPHILS # BLD AUTO: 4.37 X10(3) UL (ref 1.5–7.7)
NEUTROPHILS NFR BLD AUTO: 52 %
OSMOLALITY SERPL CALC.SUM OF ELEC: 280 MOSM/KG (ref 275–295)
P AXIS: 68 DEGREES
P AXIS: 71 DEGREES
P-R INTERVAL: 158 MS
P-R INTERVAL: 164 MS
PLATELET # BLD AUTO: 326 10(3)UL (ref 150–450)
POTASSIUM SERPL-SCNC: 4.6 MMOL/L (ref 3.5–5.1)
PROT SERPL-MCNC: 7.3 G/DL (ref 5.7–8.2)
Q-T INTERVAL: 410 MS
Q-T INTERVAL: 444 MS
QRS DURATION: 94 MS
QRS DURATION: 94 MS
QTC CALCULATION (BEZET): 433 MS
QTC CALCULATION (BEZET): 469 MS
R AXIS: -14 DEGREES
R AXIS: 21 DEGREES
RBC # BLD AUTO: 4.78 X10(6)UL
SODIUM SERPL-SCNC: 135 MMOL/L (ref 136–145)
T AXIS: -48 DEGREES
T AXIS: 111 DEGREES
TROPONIN I SERPL HS-MCNC: 3 NG/L
TROPONIN I SERPL HS-MCNC: 3 NG/L
VENTRICULAR RATE: 67 BPM
VENTRICULAR RATE: 67 BPM
WBC # BLD AUTO: 8.4 X10(3) UL (ref 4–11)

## 2024-07-22 PROCEDURE — 36415 COLL VENOUS BLD VENIPUNCTURE: CPT

## 2024-07-22 PROCEDURE — 99285 EMERGENCY DEPT VISIT HI MDM: CPT

## 2024-07-22 PROCEDURE — 99284 EMERGENCY DEPT VISIT MOD MDM: CPT

## 2024-07-22 PROCEDURE — 93005 ELECTROCARDIOGRAM TRACING: CPT

## 2024-07-22 PROCEDURE — 80053 COMPREHEN METABOLIC PANEL: CPT | Performed by: STUDENT IN AN ORGANIZED HEALTH CARE EDUCATION/TRAINING PROGRAM

## 2024-07-22 PROCEDURE — 85025 COMPLETE CBC W/AUTO DIFF WBC: CPT | Performed by: STUDENT IN AN ORGANIZED HEALTH CARE EDUCATION/TRAINING PROGRAM

## 2024-07-22 PROCEDURE — 93010 ELECTROCARDIOGRAM REPORT: CPT

## 2024-07-22 PROCEDURE — 71045 X-RAY EXAM CHEST 1 VIEW: CPT | Performed by: STUDENT IN AN ORGANIZED HEALTH CARE EDUCATION/TRAINING PROGRAM

## 2024-07-22 PROCEDURE — 84484 ASSAY OF TROPONIN QUANT: CPT | Performed by: STUDENT IN AN ORGANIZED HEALTH CARE EDUCATION/TRAINING PROGRAM

## 2024-07-22 NOTE — ED PROVIDER NOTES
[facility] Emergency Department Note  Patient: Sol Sutton Age: 74 year old Sex: female      MRN: [id number]  : 3/25/1950    Patient Seen in: Health system Emergency Department    History     Chief Complaint   Patient presents with    Chest Pain Angina     Stated Complaint: cp    History obtained from: Patient and EMS report    Patient is a 74-year-old female with past medical history of CAD status post CABG, hypertension, hyperlipidemia, breast cancer currently in remission, fibromyalgia, anxiety brought in by EMS for evaluation of chest pain.  She states that starting last night, she has been having heaviness and tightness  Of the chest.  States that today, the tightness radiated to her jaw on her arm while she was sitting watching TV.  Denies any shortness of breath, nausea or vomiting.  Patient states that her symptoms have significantly improved at this time.  She states that she has been significantly stressed at home.  States that she believes her symptoms are due to her anxiety.  States that last night, the symptoms improved after taking lorazepam.  Per EMS report, patient was given 3 doses of baby aspirin while in [place].    Review of Systems:  Review of Systems  Positive for stated complaint: cp. Constitutional and vital signs reviewed. All other systems reviewed and negative except as noted above.    Patient History:  Past Medical History:    Anxiety    Atherosclerosis of aorta (HCC)    Breast cancer (HCC)    right    CANCER    Dyslipidemia    Esophageal reflux    Essential hypertension, hypertension with unspecified goal    Exposure to medical diagnostic radiation    Fibromyalgia    Heart attack (HCC)    High blood pressure    High cholesterol    History of breast cancer    Hypertension    Refusal of statin medication by patient    Renal disorder    Visual impairment       Past Surgical History:   Procedure Laterality Date    Chemotherapy      Cholecystectomy      Colonoscopy       in  [facility]    Colonoscopy      Colonoscopy N/A 2022    Procedure: COLONOSCOPY;  Surgeon: Herb Ryan MD;  Location: [facility]    Egd  2024    Dr. Shaver- Esophageal dilation to 20mm    Excisional biopsy left      Excisional biopsy left      same scar as first time    Hx breast cancer      Hysterectomy      Ir angiogram      Lumpectomy right      Needle biopsy left      Radiation right      Remv cataract extracap,insert lens Left 2016    Procedure: LEFT PHACOEMULSIFICATION OF CATARACT WITH INTRAOCULAR LENS IMPLANT [id number];  Surgeon: Turner Boudreaux MD;  Location: [facility]    Remv cataract extracap,insert lens Right 02/10/2016    Procedure: RIGHT PHACOEMULSIFICATION OF CATARACT WITH INTRAOCULAR LENS IMPLANT [id number];  Surgeon: Turner Boudreaux MD;  Location: [facility]    Total hip replacement Left 2023        Family History   Problem Relation Age of Onset    Heart Disorder Father     Hypertension Father     Heart Disorder Mother     Hypertension Mother     Heart Disorder Daughter     Breast Cancer Maternal Aunt         Age at dx ?    Breast Cancer Maternal Aunt         Age at dx ?    Lung Disorder Neg        Specific Social Determinants of Health:   Social History     Socioeconomic History    Marital status: Single   Tobacco Use    Smoking status: Former     Current packs/day: 0.00     Average packs/day: 1.5 packs/day for 30.0 years (45.0 ttl pk-yrs)     Types: Cigarettes     Start date: 1976     Quit date: 2006     Years since quittin.2    Smokeless tobacco: Never   Vaping Use    Vaping status: Never Used   Substance and Sexual Activity    Alcohol use: Not Currently     Alcohol/week: 2.0 standard drinks of alcohol     Types: 2 Standard drinks or equivalent per week     Comment: seldom    Drug use: Never    Sexual activity: Never           PSFH elements reviewed from today and agreed except as otherwise stated in  HPI.    Physical Exam     ED Triage Vitals   BP 07/22/24 1115 158/67   Pulse 07/22/24 1115 66   Resp 07/22/24 1115 15   Temp 07/22/24 1116 98 °F (36.7 °C)   Temp src 07/22/24 1116 Oral   SpO2 07/22/24 1115 96 %   O2 Device 07/22/24 1115 None (Room air)       Current:/69   Pulse 64   Temp 97.9 °F (36.6 °C) (Oral)   Resp 13   Ht 162.6 cm (5' 4\")   Wt 59.9 kg   SpO2 97%   BMI 22.66 kg/m²         Physical Exam  Constitutional:       General: She is not in acute distress.  HENT:      Head: Normocephalic and atraumatic.      Mouth/Throat:      Mouth: Mucous membranes are moist.   Eyes:      Extraocular Movements: Extraocular movements intact.   Cardiovascular:      Rate and Rhythm: Normal rate and regular rhythm.      Heart sounds: Normal heart sounds.   Pulmonary:      Effort: Pulmonary effort is normal. No respiratory distress.      Breath sounds: Normal breath sounds.   Abdominal:      Palpations: Abdomen is soft.      Tenderness: There is no abdominal tenderness.   Skin:     General: Skin is warm and dry.      Capillary Refill: Capillary refill takes less than 2 seconds.      Findings: No rash.   Neurological:      General: No focal deficit present.      Mental Status: She is alert and oriented to person, place, and time.   Psychiatric:         Mood and Affect: Mood normal.         Behavior: Behavior normal.         ED Course   Labs:   Labs Reviewed   COMP METABOLIC PANEL (14) - Abnormal; Notable for the following components:       Result Value    Sodium 135 (*)     Creatinine 1.49 (*)     BUN/CREA Ratio 9.4 (*)     eGFR-Cr 36 (*)     ALT <7 (*)     All other components within normal limits   TROPONIN I HIGH SENSITIVITY - Normal   TROPONIN I HIGH SENSITIVITY - Normal   CBC WITH DIFFERENTIAL WITH PLATELET    Narrative:     The following orders were created for panel order CBC With Differential With Platelet.  Procedure                               Abnormality         Status                     ---------                                -----------         ------                     CBC W/ DIFFERENTIAL[[id number]]                              Final result                 Please view results for these tests on the individual orders.   RAINBOW DRAW LAVENDER   RAINBOW DRAW LIGHT GREEN   RAINBOW DRAW BLUE   RAINBOW DRAW GOLD   CBC W/ DIFFERENTIAL     Radiology findings:  I personally reviewed the images.   XR CHEST AP PORTABLE  (CPT=71045)    Result Date: 7/22/2024  CONCLUSION:   Bibasilar opacities, thought to represent subsegmental atelectasis in the setting of low lung volumes.      Dictated by (CST): Pia French MD on 7/22/2024 at 12:26 PM     Finalized by (CST): Pia French MD on 7/22/2024 at 12:27 PM           EKG as interpreted by me: Ventricular rate 67, normal sinus rhythm, normal axis, no KY interval prolongation, narrow QRS, QTc 433 ms, less than 1 mm ST segment elevation in aVR and less than 1 mm ST segment depression in V3 through V6, unchanged from prior EKG    Repeat EKG as interpreted by me: Ventricular rate 67, normal sinus rhythm, normal axis, no KY interval prolongation, narrow QRS, QTc 469 ms, less than 1 mm ST segment elevation in aVR and less than 1 mm ST segment depression in V3 through V6, unchanged from prior EKG    Cardiac Monitor: Interpreted by me.   Pulse Readings from Last 1 Encounters:   07/22/24 64   , sinus,     External non-ED records reviewed independently by me: Cardiac nuclear stress test from 5/15/2024 with no evidence of myocardial ischemia or infarct.    MDM   74-year-old female with past medical history of CAD status post CABG, hypertension, hyperlipidemia, breast cancer currently in remission, fibromyalgia, anxiety presenting for evaluation of chest pain since last night.  All symptoms improved currently.  On exam, she is well-appearing and in no acute distress.  Appears significantly anxious.  Vitals within normal limits    Differential diagnoses considered includes, but is  not limited to: ACS, chest wall strain, anxiety, pneumonia, pneumothorax    Will obtain the following tests: CBC, CMP, troponin x 2, chest x-ray, EKG x 2  Please see ED course for my independent review of these tests/imaging results.    Initial Medications/Therapeutics administered: None    Chronic conditions affecting care: CAD status post CABG, hypertension, hyperlipidemia, breast cancer currently in remission, fibromyalgia, anxiety    Workup and medications considered but not ordered: Considered admission    Social Determinants of Health that impacted care: None     ED course: Laboratory workup is reassuring with negative troponin x 2.  EKG with no acute dynamic changes.  Independently reviewed the chest x-ray images that show no evidence of pneumothorax.  Agree with radiology read above.  Upon reevaluation, noted to have resolution of her symptoms.  We discussed that patient is high risk for cardiac related etiology of her symptoms.  I offered inpatient hospitalization for cardiac evaluation and further ACS rule out which patient declined stating that she believes her symptoms are secondary to anxiety.  Discussed need for close cardiology follow-up on an outpatient basis to which patient is agreeable.  I discussed the case with the [facility] coordinator who has agreed to arrange for outpatient follow-up.  Stable for discharge home at this time with strict return precautions including any recurrence of pain or new or concerning symptoms.  Return precautions provided and all questions answered.  Patient expressed understanding and agreement with plan.  Stable at time of discharge        Disposition and Plan     Clinical Impression:  1. Chest pain of uncertain etiology        Disposition:  Discharge    Follow-up:  Ying Gamboa MD  [address]  [place] [address] [phone]    Schedule an appointment as soon as possible for a visit in 2 day(s)  As needed, If symptoms worsen    Julio César  MD [place]  [facility] [place] [address] [address]  [phone]    Schedule an appointment as soon as possible for a visit in 2 day(s)  the [facility] coordinator will reach out to schedule an appointment this week.      Medications Prescribed:  Discharge Medication List as of 7/22/2024  2:20 PM            This note may have been created using voice dictation technology and may include inadvertent errors.      Varsha Gatica MD  Emergency Medicine

## 2024-07-22 NOTE — DISCHARGE INSTRUCTIONS
Thank you for seeking care at [facility] Emergency Department.  You have been seen and evaluated for chest discomfort     We discussed the results of your workup. Your labs, EKG, and chest x-ray did not show severe findings   Please read the instructions provided   If given prescriptions, take as instructed    Remember, your care process does not end after your visit today. Please follow-up with your doctor within 1-2 days for a follow-up check to ensure you are  improving, to see if you need any further evaluation/testing, or to evaluate for any alternate diagnoses.    Please return to the emergency department if you develop severe and persistent chest pain, difficulty breathing, back pain, abdominal pain, lightheadedness or dizziness, passing out, new swelling, fevers, coughing up blood, feeling ill, not acting normally, worsening symptoms, or for any other concerns as these can be signs of a medical emergency.     We hope you feel better.

## 2024-07-22 NOTE — ED INITIAL ASSESSMENT (HPI)
Patient is here with a chest pain that started last night which got worse today by radiating to her left arm and jaw. Patient has history of heart attack in 2008 that presented with the same symptoms.  3 baby Asprins given by EMS. Patient states feeling better after that. EKG per EMS:NS.  Patient denies shortness of breath.

## 2025-02-18 ENCOUNTER — APPOINTMENT (OUTPATIENT)
Dept: GENERAL RADIOLOGY | Facility: HOSPITAL | Age: 75
End: 2025-02-18
Attending: EMERGENCY MEDICINE
Payer: MEDICARE

## 2025-02-18 ENCOUNTER — HOSPITAL ENCOUNTER (EMERGENCY)
Facility: HOSPITAL | Age: 75
Discharge: HOME | End: 2025-02-18
Attending: EMERGENCY MEDICINE
Payer: MEDICARE

## 2025-02-18 VITALS
WEIGHT: 130.06 LBS | OXYGEN SATURATION: 95 % | BODY MASS INDEX: 22 KG/M2 | DIASTOLIC BLOOD PRESSURE: 59 MMHG | HEART RATE: 67 BPM | RESPIRATION RATE: 15 BRPM | SYSTOLIC BLOOD PRESSURE: 132 MMHG | TEMPERATURE: 98 F

## 2025-02-18 DIAGNOSIS — R07.9 CHEST PAIN OF UNCERTAIN ETIOLOGY: Primary | ICD-10-CM

## 2025-02-18 LAB
ANION GAP SERPL CALC-SCNC: 8 MMOL/L (ref 0–18)
ATRIAL RATE: 80 BPM
BASOPHILS # BLD AUTO: 0.05 X10(3) UL (ref 0–0.2)
BASOPHILS NFR BLD AUTO: 0.4 %
BUN BLD-MCNC: 34 MG/DL (ref 9–23)
BUN/CREAT SERPL: 22.4 (ref 10–20)
CALCIUM BLD-MCNC: 9.4 MG/DL (ref 8.7–10.4)
CHLORIDE SERPL-SCNC: 98 MMOL/L (ref 98–112)
CO2 SERPL-SCNC: 24 MMOL/L (ref 21–32)
CREAT BLD-MCNC: 1.52 MG/DL
DEPRECATED RDW RBC AUTO: 44.1 FL (ref 35.1–46.3)
EGFRCR SERPLBLD CKD-EPI 2021: 35 ML/MIN/1.73M2 (ref 60–?)
EOSINOPHIL # BLD AUTO: 0.3 X10(3) UL (ref 0–0.7)
EOSINOPHIL NFR BLD AUTO: 2.4 %
ERYTHROCYTE [DISTWIDTH] IN BLOOD BY AUTOMATED COUNT: 14.2 % (ref 11–15)
GLUCOSE BLD-MCNC: 103 MG/DL (ref 70–99)
HCT VFR BLD AUTO: 41 %
HGB BLD-MCNC: 13.5 G/DL
IMM GRANULOCYTES # BLD AUTO: 0.07 X10(3) UL (ref 0–1)
IMM GRANULOCYTES NFR BLD: 0.6 %
LYMPHOCYTES # BLD AUTO: 2.91 X10(3) UL (ref 1–4)
LYMPHOCYTES NFR BLD AUTO: 23.3 %
MCH RBC QN AUTO: 28.1 PG (ref 26–34)
MCHC RBC AUTO-ENTMCNC: 32.9 G/DL (ref 31–37)
MCV RBC AUTO: 85.2 FL
MONOCYTES # BLD AUTO: 1.09 X10(3) UL (ref 0.1–1)
MONOCYTES NFR BLD AUTO: 8.7 %
NEUTROPHILS # BLD AUTO: 8.06 X10 (3) UL (ref 1.5–7.7)
NEUTROPHILS # BLD AUTO: 8.06 X10(3) UL (ref 1.5–7.7)
NEUTROPHILS NFR BLD AUTO: 64.6 %
OSMOLALITY SERPL CALC.SUM OF ELEC: 278 MOSM/KG (ref 275–295)
P AXIS: 21 DEGREES
P-R INTERVAL: 200 MS
PLATELET # BLD AUTO: 395 10(3)UL (ref 150–450)
POTASSIUM SERPL-SCNC: 4.4 MMOL/L (ref 3.5–5.1)
Q-T INTERVAL: 346 MS
QRS DURATION: 102 MS
QTC CALCULATION (BEZET): 399 MS
R AXIS: -16 DEGREES
RBC # BLD AUTO: 4.81 X10(6)UL
SODIUM SERPL-SCNC: 130 MMOL/L (ref 136–145)
T AXIS: 89 DEGREES
TROPONIN I SERPL HS-MCNC: 4 NG/L
VENTRICULAR RATE: 80 BPM
WBC # BLD AUTO: 12.5 X10(3) UL (ref 4–11)

## 2025-02-18 PROCEDURE — 84484 ASSAY OF TROPONIN QUANT: CPT | Performed by: EMERGENCY MEDICINE

## 2025-02-18 PROCEDURE — 85025 COMPLETE CBC W/AUTO DIFF WBC: CPT | Performed by: EMERGENCY MEDICINE

## 2025-02-18 PROCEDURE — 93010 ELECTROCARDIOGRAM REPORT: CPT

## 2025-02-18 PROCEDURE — 36415 COLL VENOUS BLD VENIPUNCTURE: CPT

## 2025-02-18 PROCEDURE — 99284 EMERGENCY DEPT VISIT MOD MDM: CPT

## 2025-02-18 PROCEDURE — 71045 X-RAY EXAM CHEST 1 VIEW: CPT | Performed by: EMERGENCY MEDICINE

## 2025-02-18 PROCEDURE — 80048 BASIC METABOLIC PNL TOTAL CA: CPT | Performed by: EMERGENCY MEDICINE

## 2025-02-18 PROCEDURE — 93005 ELECTROCARDIOGRAM TRACING: CPT

## 2025-02-18 NOTE — ED PROVIDER NOTES
Patient Seen in: [facility] Emergency Department    History     Chief Complaint   Patient presents with    Chest Pain Angina       HPI    74-year-old patient was female presents ER with complaint of left-sided chest pain that started yesterday evening.  Patient with a past medical history of breast cancer, hypertension, CABG.  Patient states that she took baby aspirin as well as Tylenol her pain is improved.  Patient states the left-sided chest pain is rating to her left arm as well as for dropped    History reviewed.   Past Medical History:    Anxiety    Atherosclerosis of aorta    Breast cancer (HCC)    right    CANCER    Dyslipidemia    Esophageal reflux    Essential hypertension, hypertension with unspecified goal    Exposure to medical diagnostic radiation    Fibromyalgia    Heart attack (HCC)    High blood pressure    High cholesterol    History of breast cancer    Hypertension    Refusal of statin medication by patient    Renal disorder    Visual impairment       History reviewed.   Past Surgical History:   Procedure Laterality Date    Chemotherapy  2003    Cholecystectomy      Colonoscopy  2015     in hospital    Colonoscopy      Colonoscopy N/A 03/30/2022    Procedure: COLONOSCOPY;  Surgeon: Herb Ryan MD;  Location: [facility]    Egd  07/08/2024    Dr. Shaver- Esophageal dilation to 20mm    Excisional biopsy left  1980's    Excisional biopsy left  1980's    same scar as first time    Hx breast cancer      Hysterectomy      Ir angiogram      Lumpectomy right  2003    Needle biopsy left      Radiation right  2003    Remv cataract extracap,insert lens Left 01/27/2016    Procedure: LEFT PHACOEMULSIFICATION OF CATARACT WITH INTRAOCULAR LENS IMPLANT [id number];  Surgeon: Turner Boudreaux MD;  Location: [facility]    Remv cataract extracap,insert lens Right 02/10/2016    Procedure: RIGHT PHACOEMULSIFICATION OF CATARACT WITH INTRAOCULAR LENS IMPLANT [id number];  Surgeon: Turner Boudreaux  MD;  [facility]    Total hip replacement Left 2023         Medications :  Prescriptions Prior to Admission[1]     Family History   Problem Relation Age of Onset    Heart Disorder Father     Hypertension Father     Heart Disorder Mother     Hypertension Mother     Heart Disorder Daughter     Breast Cancer Maternal Aunt         Age at dx ?    Breast Cancer Maternal Aunt         Age at dx ?    Lung Disorder Neg        Smoking Status:   Social History     Socioeconomic History    Marital status: Single   Tobacco Use    Smoking status: Former     Current packs/day: 0.00     Average packs/day: 1.5 packs/day for 30.0 years (45.0 ttl pk-yrs)     Types: Cigarettes     Start date: 1976     Quit date: 2006     Years since quittin.7    Smokeless tobacco: Never   Vaping Use    Vaping status: Never Used   Substance and Sexual Activity    Alcohol use: Not Currently     Alcohol/week: 2.0 standard drinks of alcohol     Types: 2 Standard drinks or equivalent per week     Comment: seldom    Drug use: Never    Sexual activity: Never       ROS  All pertinent positives for the review of systems are mentioned in the HPI  All other organ systems are reviewed and are negative.    Constitutional and vital signs reviewed.      Social History and Family History elements reviewed from today, pertinent positives to the presenting problem noted.    Physical Exam     ED Triage Vitals [25 1614]   /85   Pulse 99   Resp 18   Temp 98 °F (36.7 °C)   Temp src    SpO2 99 %   O2 Device None (Room air)       All measures to prevent infection transmission during my interaction with the patient were taken. The patient was already wearing a droplet mask on my arrival to the room. Personal protective equipment including droplet mask, eye protection, and gloves were worn throughout the duration of the exam.  Handwashing was performed prior to and after the exam.  Stethoscope and any equipment used during my  examination was cleaned with super sani-cloth germicidal wipes following the exam.     Physical Exam  Vitals and nursing note reviewed.   Constitutional:       Appearance: She is well-developed.   HENT:      Head: Normocephalic and atraumatic.      Right Ear: External ear normal.      Left Ear: External ear normal.      Nose: Nose normal.   Eyes:      Conjunctiva/sclera: Conjunctivae normal.      Pupils: Pupils are equal, round, and reactive to light.   Cardiovascular:      Rate and Rhythm: Normal rate and regular rhythm.      Heart sounds: Normal heart sounds.   Pulmonary:      Effort: Pulmonary effort is normal.      Breath sounds: Normal breath sounds.   Abdominal:      General: Bowel sounds are normal.      Palpations: Abdomen is soft.   Musculoskeletal:         General: Normal range of motion.      Cervical back: Normal range of motion and neck supple.   Skin:     General: Skin is warm and dry.      Capillary Refill: Capillary refill takes less than 2 seconds.   Neurological:      General: No focal deficit present.      Mental Status: She is alert and oriented to person, place, and time.      Deep Tendon Reflexes: Reflexes are normal and symmetric.   Psychiatric:         Behavior: Behavior normal.         Thought Content: Thought content normal.         Judgment: Judgment normal.         ED Course        Labs Reviewed   BASIC METABOLIC PANEL (8) - Abnormal; Notable for the following components:       Result Value    Glucose 103 (*)     Sodium 130 (*)     BUN 34 (*)     Creatinine 1.52 (*)     BUN/CREA Ratio 22.4 (*)     eGFR-Cr 35 (*)     All other components within normal limits   CBC WITH DIFFERENTIAL WITH PLATELET - Abnormal; Notable for the following components:    WBC 12.5 (*)     Neutrophil Absolute Prelim 8.06 (*)     Neutrophil Absolute 8.06 (*)     Monocyte Absolute 1.09 (*)     All other components within normal limits   TROPONIN I HIGH SENSITIVITY - Normal     EKG    Rate, intervals and axes as noted  on EKG Report.  Rate: 80  Rhythm: Sinus Rhythm  Reading: LVH, no ST deviation, unchanged previous EKG on 7/22/2024             Imaging Results Available and Reviewed while in ED: XR CHEST AP PORTABLE  (CPT=71045)    Result Date: 2/18/2025  CONCLUSION:  1. No acute cardiopulmonary process. 2. Postoperative changes again seen from a previous CABG. 3. Lesser incidental findings as above.    Dictated by (CST): Ryan Camargo MD on 2/18/2025 at 5:08 PM     Finalized by (CST): Ryan Camargo MD on 2/18/2025 at 5:10 PM         ED Medications Administered: Medications - No data to display      MDM     Vitals:    02/18/25 1614 02/18/25 1745   BP: 154/85 140/58   Pulse: 99 68   Resp: 18 20   Temp: 98 °F (36.7 °C)    SpO2: 99% 98%   Weight: 59 kg      *I personally reviewed and interpreted all ED vitals.  I also personally reviewed all labs and imaging if ordered    Pulse Ox: 99%, Room air, Normal     Monitor Interpretation:   normal sinus rhythm    Differential Diagnosis/ Diagnostic Considerations: Pain, chest wall pain, muscle strain, gastritis    Medical Record Review: I personally reviewed available prior medical records for any recent pertinent discharge summaries, testing, and procedures and reviewed those reports.    Complicating Factors: The patient already has does not have any pertinent problems on file. to contribute to the complexity of this ED evaluation.    Medical Decision Making  74-year-old female presents ER with complaint of left-sided chest pain.  Yesterday evening.  Patient pains states is improved.  Patient blood work including EKG and chest x-ray negative.  Patient feels currently being discharged home.  Patient instructed follow with PCP if symptoms continue.  Patient's daughter bedside made aware of the discharge planning disposition.    HEART score for chest pain  History- (Highly suspicious 2pt, Mod 1pt, slightly 0pt)        0  ECG- (significant ST deviation 2pt, Non spec 1pt, nl  0pt)  0  AGE- (>65 2pt, 45-64 1 pt, Under 45 0 pt)    2  Risk Factors- ( DM,HTN,Chol, fhx CAD, BMI>30, hx CAD)  ( >3 or hx CAD 2pt, 1-2 risks 1pt, None 0pt)  2  Troponin- ( 3 times nl 2pt, 2 times nl 1pt, nl 0pt   0         TOTAL  4    SCORE 0-3: 2.5% MACE over next 6 weeks consider D/C outpatient F/U  SCORE 4-6: 20.3% MACE over next 6 weeks consider admit  SCORE 7-10:72.7% MACE over next 6 weeks consider early invasive strategy.    Patient has a HEART score of 4, plan will be discharge.       Problems Addressed:  Chest pain of uncertain etiology: acute illness or injury    Amount and/or Complexity of Data Reviewed  External Data Reviewed: notes.     Details: Previous admissions reviewed.  Patient with past medical history of CABG in the past.  Labs: ordered. Decision-making details documented in ED Course.  Radiology: ordered and independent interpretation performed. Decision-making details documented in ED Course.     Details: X-ray reviewed by myself shows no acute cardiopulmonary issues.        Condition upon leaving the department: Stable    Disposition and Plan     Clinical Impression:  1. Chest pain of uncertain etiology        Disposition:  Discharge    Follow-up:  Ying Gamboa MD  [address]  [address] [place] [address] [phone]    Schedule an appointment as soon as possible for a visit  If symptoms worsen      Medications Prescribed:  Current Discharge Medication List                 [1] (Not in a hospital admission)

## 2025-02-18 NOTE — ED INITIAL ASSESSMENT (HPI)
Left sided chest pain with a metallic taste in her mouth began last night. Pt also reports right sided chest pain radiating to back.

## (undated) DEVICE — CO2 CANNULA,SSOFT,ADLT,7O2,4CO2,FEMALE: Brand: MEDLINE

## (undated) DEVICE — NEEDLE SPINAL 20X3-1/2 YELLOW

## (undated) DEVICE — CONMED SCOPE SAVER BITE BLOCK, 20X27 MM: Brand: SCOPE SAVER

## (undated) DEVICE — SOL  .9 1000ML BTL

## (undated) DEVICE — 3M™ STERI-STRIP™ BLEND TONE SKIN CLOSURES, B1557, TAN, 1/2 IN X 4 IN (12MM X 100MM), 6 STRIPS/ENVELOPE: Brand: 3M™ STERI-STRIP™

## (undated) DEVICE — SUTURE SILK 0 FSL

## (undated) DEVICE — PROXIMATE SKIN STAPLERS (35 WIDE) CONTAINS 35 STAINLESS STEEL STAPLES (FIXED HEAD): Brand: PROXIMATE

## (undated) DEVICE — DEVICE INFL 60ML 15ATM PRSS COOK SPHR

## (undated) DEVICE — Device: Brand: JELCO

## (undated) DEVICE — COSEAL SURGICAL SEALANT (COSEAL) IS COMPOSED OF TWO SYNTHETIC POLYETHYLENE GLYCOLS (PEGS), A DILUTE HYDROGEN CHLORIDE SOLUTION AND A SODIUM PHOSPHATE/SODIUM CARBONATE SOLUTION. THESE COMPONENTS COME IN A KIT THAT INCLUDES AN APPLICATOR(S). AT THE TIME OF ADMINISTRATION, THE MIXED PEGS AND SOLUTIONS FORM A HYDROGEL THAT ADHERES TO TISSUE, SYNTHETIC GRAFT MATERIALS AND COVALENTLY BONDS TO ITSELF: Brand: COSEAL SURGICAL SEALANT

## (undated) DEVICE — SOL  .9 3000ML

## (undated) DEVICE — SUTURE PROLENE 3-0 SH

## (undated) DEVICE — DRAPE SRG 26X15IN UTL TPE STRL

## (undated) DEVICE — DRESSING FM 4.25X4.25IN PLMM

## (undated) DEVICE — HOVERMATT 34IN SINGLE USE

## (undated) DEVICE — 12 ML SYRINGE LUER-LOCK TIP: Brand: MONOJECT

## (undated) DEVICE — SOLUTION ENDOSCOPIC ANTI-FOG NON-TOXIC NON-ABRASIVE 6 CUBIC CENTIMETER WITH RADIOPAQUE ADHESIVE-BACKED SPONGE STERILE NOT MADE WITH NATURAL RUBBER LATEX MEDICHOICE: Brand: MEDICHOICE

## (undated) DEVICE — TRAY SRGPRP PVP IOD WT SCRB SM

## (undated) DEVICE — HIGH FLOW IRRIGATION: Brand: TUBE SET

## (undated) DEVICE — SUTURE MONOCRYL 3-0 Y936H

## (undated) DEVICE — GAUZE SPONGES,12 PLY: Brand: CURITY

## (undated) DEVICE — INSULATED BLADE ELECTRODE 6.5

## (undated) DEVICE — SOL  .9 1000ML BAG

## (undated) DEVICE — DECANTER VIAL FLOW DISPENSER

## (undated) DEVICE — CONTAINER SPEC STR 4OZ GRY LID

## (undated) DEVICE — 3M™ IOBAN™ 2 ANTIMICROBIAL INCISE DRAPE 6650EZ: Brand: IOBAN™ 2

## (undated) DEVICE — SUTURE SILK 4-0 SA63H

## (undated) DEVICE — CV: Brand: MEDLINE INDUSTRIES, INC.

## (undated) DEVICE — CATH RED RUBBER 18FR

## (undated) DEVICE — YANKAUER,BULB TIP,W/O VENT,RIGID,STERILE: Brand: MEDLINE

## (undated) DEVICE — SUTURE PROLENE 4-0 BB

## (undated) DEVICE — TISSUE RETRIEVAL SYSTEM: Brand: INZII RETRIEVAL SYSTEM

## (undated) DEVICE — SOL H2O 1000ML BTL

## (undated) DEVICE — POUCH: SSEAL TYVEK 2000/CS: Brand: MEDICAL ACTION INDUSTRIES

## (undated) DEVICE — STERILE LATEX POWDER-FREE SURGICAL GLOVESWITH NITRILE COATING: Brand: PROTEXIS

## (undated) DEVICE — SUCTION CANISTER, 3000CC,SAFELINER: Brand: DEROYAL

## (undated) DEVICE — COVER SGL STRL LGHT HNDL BLU

## (undated) DEVICE — SUTURE PROLENE 5-0 C-1

## (undated) DEVICE — LASSO POLYPECTOMY SNARE: Brand: LASSO

## (undated) DEVICE — SUTURE PROLENE 6-0 C-1

## (undated) DEVICE — HEART A: Brand: MEDLINE INDUSTRIES, INC.

## (undated) DEVICE — 3M™ TEGADERM™ TRANSPARENT FILM DRESSING, 1626W, 4 IN X 4-3/4 IN (10 CM X 12 CM), 50 EACH/CARTON, 4 CARTON/CASE: Brand: 3M™ TEGADERM™

## (undated) DEVICE — HEART DRAPE & SUPPLY PACK: Brand: MEDLINE INDUSTRIES, INC.

## (undated) DEVICE — GOWN SURG AERO BLUE PERF LG

## (undated) DEVICE — STERILE POLYISOPRENE POWDER-FREE SURGICAL GLOVES: Brand: PROTEXIS

## (undated) DEVICE — MEDI-VAC NON-CONDUCTIVE SUCTION TUBING: Brand: CARDINAL HEALTH

## (undated) DEVICE — KIT ENDO ORCAPOD 160/180/190

## (undated) DEVICE — SUTURE PDS II 2-0 CT-1

## (undated) DEVICE — SUTURE PROLENE 7-0 BV-1

## (undated) DEVICE — SUTURE SILK 2-0

## (undated) DEVICE — PREVENA PEEL & PLACE SYSTEM KIT- 13 CM: Brand: PREVENA™ PEEL & PLACE™

## (undated) DEVICE — CLIP SM W INTNL HRZN TI TPE LF

## (undated) DEVICE — HERCULES 3 STAGE BALLOON ESOPHAGEAL: Brand: HERCULES

## (undated) DEVICE — KIT CLEAN ENDOKIT 1.1OZ GOWNX2

## (undated) DEVICE — DERMABOND LIQUID ADHESIVE

## (undated) DEVICE — SUTURE ETHIBOND 1 CT-1

## (undated) DEVICE — SPONGE: SPECIALTY PEANUT XR 100/CS: Brand: MEDICAL ACTION INDUSTRIES

## (undated) DEVICE — SYRINGE, LUER SLIP, STERILE, 60ML: Brand: MEDLINE

## (undated) DEVICE — MEDI-VAC NON-CONDUCTIVE SUCTION TUBING 6MM X 1.8M (6FT.) L: Brand: CARDINAL HEALTH

## (undated) DEVICE — ABSORBABLE HEMOSTAT (OXIDIZED REGENERATED CELLULOSE, U.S.P.): Brand: SURGICEL

## (undated) DEVICE — X-RAY DETECTABLE SPONGES,16 PLY: Brand: VISTEC

## (undated) DEVICE — SUTURE PROLENE 6-0 BV-1

## (undated) NOTE — ED AVS SNAPSHOT
Ricardo Bowling   MRN: [id number]    Department:  [facility] Emergency Department   Date of Visit:  9/3/2017           Disclosure     Insurance plans vary and the physician(s) referred by the ER may not be covered by your plan.  Please contact yo CARE PHYSICIAN AT ONCE OR RETURN IMMEDIATELY TO THE EMERGENCY DEPARTMENT. If you have been prescribed any medication(s), please fill your prescription right away and begin taking the medication(s) as directed.   If you believe that any of the medications

## (undated) NOTE — LETTER
[facility]  Administration of Anesthesia  1. I, Atiya Gamez, or _________________________________ acting on her behalf, (Patient) (Dependent/Representative) request to receive anesthesia for my pending procedure/operation/treatment.   A infections, high spinal block, spinal bleeding, seizure, cardiac arrest and death. 7. AWARENESS: I understand that it is possible (but unlikely) to have explicit memory of events from the operating room while under general anesthesia.   8. ELECTROCONVULSIV unconscious pt /Relationship    My signature below affirms that prior to the time of the procedure, I have explained to the patient and/or his/her guardian, the risks and benefits of undergoing anesthesia, as well as any reasonable alternatives.     _______

## (undated) NOTE — LETTER
[address] [facility], [facility] [address]     I agree to have a Peripherally Inserted Central Catheter (PICC) placed in my arm.      1. The PICC insertion procedure, care, maintenance, risks, benefits, and complications Statement of Physician: My signature below affirms that prior to the time of the PICC line insertion, I have explained to the patient and/or his/her legal representative, the risks and benefits involved in the proposed treatment and any reasonable alternat

## (undated) NOTE — LETTER
January 4, 2018    Patient: Modesto Adkins   Date of Visit: 1/4/2018       To Whom It May Concern:    Bryanna Charles was seen and treated in our emergency department on 1/4/2018.  She Is able to return to work however for the next 2 weeks through January

## (undated) NOTE — LETTER
[facility]  [address], [place], [place]    Authorization for Surgical Operation and Procedure                               I hereby authorize Cody Shaver MD, my physician and his/her assistants (if applicable), which may include medical students, residents, and/or fellows, to perform the following surgical operation/ procedure and administer such anesthesia as may be determined necessary by my physician: Operation/Procedure name (s) ESOPHAGOGASTRODUODENOSCOPY with Dilation on Sol Sutton   2.   I recognize that during the surgical operation/procedure, unforeseen conditions may necessitate additional or different procedures than those listed above.  I, therefore, further authorize and request that the above-named surgeon, assistants, or designees perform such procedures as are, in their judgment, necessary and desirable.    3.   My surgeon/physician has discussed prior to my surgery the potential benefits, risks and side effects of this procedure; the likelihood of achieving goals; and potential problems that might occur during recuperation.  They also discussed reasonable alternatives to the procedure, including risks, benefits, and side effects related to the alternatives and risks related to not receiving this procedure.  I have had all my questions answered and I acknowledge that no guarantee has been made as to the result that may be obtained.    4.   Should the need arise during my operation/procedure, which includes change of level of care prior to discharge, I also consent to the administration of blood and/or blood products.  Further, I understand that despite careful testing and screening of blood or blood products by collecting agencies, I may still be subject to ill effects as a result of receiving a blood transfusion and/or blood products.  The following are some, but not all, of the potential risks that can occur: fever and allergic reactions, hemolytic reactions,  transmission of diseases such as Hepatitis, AIDS and Cytomegalovirus (CMV) and fluid overload.  In the event that I wish to have an autologous transfusion of my own blood, or a directed donor transfusion, I will discuss this with my physician.  Check only if Refusing Blood or Blood Products  I understand refusal of blood or blood products as deemed necessary by my physician may have serious consequences to my condition to include possible death. I hereby assume responsibility for my refusal and release the hospital, its personnel, and my physicians from any responsibility for the consequences of my refusal.    o  Refuse   5.   I authorize the use of any specimen, organs, tissues, body parts or foreign objects that may be removed from my body during the operation/procedure for diagnosis, research or teaching purposes and their subsequent disposal by hospital authorities.  I also authorize the release of specimen test results and/or written reports to my treating physician on the hospital medical staff or other referring or consulting physicians involved in my care, at the discretion of the Pathologist or my treating physician.    6.   I consent to the photographing or videotaping of the operations or procedures to be performed, including appropriate portions of my body for medical, scientific, or educational purposes, provided my identity is not revealed by the pictures or by descriptive texts accompanying them.  If the procedure has been photographed/videotaped, the surgeon will obtain the original picture, image, videotape or CD.  The hospital will not be responsible for storage, release or maintenance of the picture, image, tape or CD.    7.   I consent to the presence of a  or observers in the operating room as deemed necessary by my physician or their designees.    8.   I recognize that in the event my procedure results in extended X-Ray/fluoroscopy time, I may develop a skin reaction.    9. If  I have a Do Not Attempt Resuscitation (DNAR) order in place, that status will be suspended while in the operating room, procedural suite, and during the recovery period unless otherwise explicitly stated by me (or a person authorized to consent on my behalf). The surgeon or my attending physician will determine when the applicable recovery period ends for purposes of reinstating the DNAR order.  10. Patients having a sterilization procedure: I understand that if the procedure is successful the results will be permanent and it will therefore be impossible for me to inseminate, conceive, or bear children.  I also understand that the procedure is intended to result in sterility, although the result has not been guaranteed.   11. I acknowledge that my physician has explained sedation/analgesia administration to me including the risk and benefits I consent to the administration of sedation/analgesia as may be necessary or desirable in the judgment of my physician.    I CERTIFY THAT I HAVE READ AND FULLY UNDERSTAND THE ABOVE CONSENT TO OPERATION and/or OTHER PROCEDURE.     ____________________________________  _________________________________        ______________________________  Signature of Patient    Signature of Responsible Person                Printed Name of Responsible Person                                      ____________________________________  _____________________________                ________________________________  Signature of Witness        Date  Time         Relationship to Patient    STATEMENT OF PHYSICIAN My signature below affirms that prior to the time of the procedure; I have explained to the patient and/or his/her legal representative, the risks and benefits involved in the proposed treatment and any reasonable alternative to the proposed treatment. I have also explained the risks and benefits involved in refusal of the proposed treatment and alternatives to the proposed treatment and have  answered the patient's questions. If I have a significant financial interest in a co-management agreement or a significant financial interest in any product or implant, or other significant relationship used in this procedure/surgery, I have disclosed this and had a discussion with my patient.     _____________________________________________________              _____________________________  (Signature of Physician)                                                                                         (Date)                                   (Time)  Patient Name: Sol Sutton      : 3/25/1950      Printed: 7/3/2024     Medical Record #: [id number]                                      Page 1 of 1

## (undated) NOTE — ED AVS SNAPSHOT
Jaime Lozano   MRN: [id number]    Department:  [facility] Emergency Department   Date of Visit:  8/24/2018           Disclosure     Insurance plans vary and the physician(s) referred by the ER may not be covered by your plan.  Please contact y within the next three months to obtain basic health screening including reassessment of your blood pressure.     IF THERE IS ANY CHANGE OR WORSENING OF YOUR CONDITION, CALL YOUR PRIMARY CARE PHYSICIAN AT ONCE OR RETURN IMMEDIATELY TO THE EMERGENCY DEPARTMEN

## (undated) NOTE — LETTER
September 3, 2017    Patient: Bri Cruz   Date of Visit: 9/3/2017       To Whom It May Concern:    Laura Olson was seen and treated in our emergency department on 9/3/2017. She can return to work on 09/06/17.     If you have any questions or eloy

## (undated) NOTE — ED AVS SNAPSHOT
[facility] in [address] [facility]  [address] [place] [id number]    Phone:  [phone]    Fax:  [phone]           Helen Carmen   MRN: [id number]    Department:  [facility] in [address] [place] [address]   Date of Visit:  3/ Where to Get Your Medications      You can get these medications from any pharmacy     Bring a paper prescription for each of these medications    - Albuterol Sulfate  (90 Base) MCG/ACT Aers              Discharge Instructions       Go to h care or specialist physician may see patients referred from the [facility] Care. Follow-up care is at the discretion of that Physician.   If you need additional assistance selecting a physician, you may call the Gail C[facility]denas [facility] [address] ([address] [place] [address]) [phone]   [facility] [address] ([facility] [address]) [address] [facility] [address] [address]  ([place] [address]) [phone]                Additional Information

## (undated) NOTE — LETTER
[address] [place] [address] Rd, [place], [place]     AUTHORIZATION FOR SURGICAL OPERATION OR PROCEDURE  1.  I hereby authorize Dr. Oksana Santamaria MD, my Physician(s) and whomever may be designated as the doctor's Assistant, to perform the follow 5. I consent to the photographing of procedure(s) to be performed for the purposes of advancing medicine, science and/or education, provided my identity is not revealed.  If the procedure has been videotaped, the physician/surgeon will obtain the original v (Witness signature)                                                                                                  (Date)                                (Time)  STATEMENT OF PHYSICIAN My signature below affirms that prior to the time of the procedure;  I

## (undated) NOTE — LETTER
[facility]  Administration of Anesthesia  1. I, Judith Blackburn, or _________________________________ acting on her behalf, (Patient) (Dependent/Representative) request to receive anesthesia for my pending procedure/operation/treatment.   A infections, high spinal block, spinal bleeding, seizure, cardiac arrest and death. 7. AWARENESS: I understand that it is possible (but unlikely) to have explicit memory of events from the operating room while under general anesthesia.   8. ELECTROCONVULSIV unconscious pt /Relationship    My signature below affirms that prior to the time of the procedure, I have explained to the patient and/or his/her guardian, the risks and benefits of undergoing anesthesia, as well as any reasonable alternatives.     _______

## (undated) NOTE — ED AVS SNAPSHOT
Sierra Lyman   MRN: [id number]    Department:  [facility] Emergency Department   Date of Visit:  1/4/2018           Disclosure     Insurance plans vary and the physician(s) referred by the ER may not be covered by your plan.  Please contact yo within the next three months to obtain basic health screening including reassessment of your blood pressure.     IF THERE IS ANY CHANGE OR WORSENING OF YOUR CONDITION, CALL YOUR PRIMARY CARE PHYSICIAN AT ONCE OR RETURN IMMEDIATELY TO THE EMERGENCY DEPARTMEN

## (undated) NOTE — LETTER
[facility]  Administration of Anesthesia  I, Sol Sutton agree to be cared for by a physician anesthesiologist alone and/or with a nurse anesthetist, who is specially trained to monitor me and give me medicine to put me to sleep or keep me comfortable during my procedure    I understand that my anesthesiologist and/or anesthetist is not an employee or agent of [facility] or [employer]. He or she works for [facility]    As the patient asking for anesthesia services, I agree to:  Allow the anesthesiologist (anesthesia doctor) to give me medicine and do additional procedures as necessary. Some examples are: Starting or using an “IV” to give me medicine, fluids or blood during my procedure, and having a breathing tube placed to help me breathe when I’m asleep (intubation). In the event that my heart stops working properly, I understand that my anesthesiologist will make every effort to sustain my life, unless otherwise directed by [facility] Do Not Resuscitate documents.  Tell my anesthesia doctor before my procedure:  If I am pregnant.  The last time that I ate or drank.  iii. All of the medicines I take (including prescriptions, herbal supplements, and pills I can buy without a prescription (including street drugs/illegal medications). Failure to inform my anesthesiologist about these medicines may increase my risk of anesthetic complications.  iv.If I am allergic to anything or have had a reaction to anesthesia before.  I understand how the anesthesia medicine will help me (benefits).  I understand that with any type of anesthesia medicine there are risks:  The most common risks are: nausea, vomiting, sore throat, muscle soreness, damage to my eyes, mouth, or teeth (from breathing tube placement).  Rare risks include: remembering what happened during my procedure, allergic reactions to medications, injury to my airway, heart, lungs, vision, nerves, or  muscles and in extremely rare instances death.  My doctor has explained to me other choices available to me for my care (alternatives).  Pregnant Patients (“epidural”):  I understand that the risks of having an epidural (medicine given into my back to help control pain during labor), include itching, low blood pressure, difficulty urinating, headache or slowing of the baby’s heart. Very rare risks include infection, bleeding, seizure, irregular heart rhythms and nerve injury.  Regional Anesthesia (“spinal”, “epidural”, & “nerve blocks”):  I understand that rare but potential complications include headache, bleeding, infection, seizure, irregular heart rhythms, and nerve injury.    _____________________________________________________________________________  Patient (or Representative) Signature/Relationship to Patient  Date   Time    _____________________________________________________________________________   Name (if used)    Language/Organization   Time    _____________________________________________________________________________  Nurse Anesthetist Signature     Date   Time  _____________________________________________________________________________  Anesthesiologist Signature     Date   Time  I have discussed the procedure and information above with the patient (or patient’s representative) and answered their questions. The patient or their representative has agreed to have anesthesia services.    _____________________________________________________________________________  Witness        Date   Time  I have verified that the signature is that of the patient or patient’s representative, and that it was signed before the procedure  Patient Name: Sol Sutton     : 3/25/1950                 Printed: 7/3/2024 at 9:42 AM    Medical Record #: [id number]                                            Page 1 of 1  ----------ANESTHESIA CONSENT----------

## (undated) NOTE — ED AVS SNAPSHOT
Ruth Dave   MRN: [id number]    Department:  [facility]   Date of Visit:  2/4/2019           Disclosure     Insurance plans vary and the physician(s) referred by the ER may not be covered by your plan.  Please contact yo within the next three months to obtain basic health screening including reassessment of your blood pressure.     IF THERE IS ANY CHANGE OR WORSENING OF YOUR CONDITION, CALL YOUR PRIMARY CARE PHYSICIAN AT ONCE OR RETURN IMMEDIATELY TO THE EMERGENCY DEPARTMEN

## (undated) NOTE — LETTER
[address], [place]  Authorization for Invasive Procedures  1.  I hereby authorize Dr. Elda Madison , my physician and whomever may be designated as the doctor's assistant, to perform the following operation and/or procedure:  Cardiac cat the event that I wish to have autologous transfusions of my own blood, or a directed donor transfusion, I will discuss this with my physician.      5. I consent to the photographing of the operations or procedures to be performed for the purposes of advanci Responsible person in case of minor or unconscious: _____________________________Relationship: ____________     Witness Signature: ____________________________________________ Date: __________ Time: ___________    Statement of Physician  My signature below

## (undated) NOTE — IP AVS SNAPSHOT
[facility]            (For Outpatient Use Only) Initial Admit Date: 3/29/2018   Inpt/Obs Admit Date: Inpt: 3/30/18 / Obs: 03/29/18   Discharge Date:    Lluvia Binet:  [de-identified]   MRN: [de-identified]   CSN: [id number]        ENCOUNTER  Patient April 13, 2018

## (undated) NOTE — LETTER
[address]  [facility] [address], [place], [place]  [address]  INFORMED CONSENT FOR TRANSFUSION OF BLOOD OR BLOOD PRODUCTS  My physician has informed me of the nature, purpose, benefits and risks of transfusion for blood and blood components that ______________________________________________  (Signature of Patient)                                                            (Responsible party in case of Minor,

## (undated) NOTE — LETTER
[address] [place] [address] Rd, [place], [place]     AUTHORIZATION FOR SURGICAL OPERATION OR PROCEDURE  1.  I hereby authorize Dr. Jeffery Tijerina  my Physician(s) and whomever may be designated as the doctor's Assistant, to perform the following operatio 5. I consent to the photographing of procedure(s) to be performed for the purposes of advancing medicine, science and/or education, provided my identity is not revealed.  If the procedure has been videotaped, the physician/surgeon will obtain the original v (Witness signature)                                                                                                  (Date)                                (Time)  STATEMENT OF PHYSICIAN My signature below affirms that prior to the time of the procedure;  I